# Patient Record
Sex: MALE | Race: WHITE | NOT HISPANIC OR LATINO | Employment: FULL TIME | URBAN - METROPOLITAN AREA
[De-identification: names, ages, dates, MRNs, and addresses within clinical notes are randomized per-mention and may not be internally consistent; named-entity substitution may affect disease eponyms.]

---

## 2019-08-24 ENCOUNTER — OFFICE VISIT (OUTPATIENT)
Dept: URGENT CARE | Facility: CLINIC | Age: 34
End: 2019-08-24
Payer: COMMERCIAL

## 2019-08-24 VITALS
RESPIRATION RATE: 18 BRPM | TEMPERATURE: 97 F | WEIGHT: 170 LBS | BODY MASS INDEX: 23.03 KG/M2 | HEIGHT: 72 IN | SYSTOLIC BLOOD PRESSURE: 112 MMHG | OXYGEN SATURATION: 99 % | DIASTOLIC BLOOD PRESSURE: 75 MMHG | HEART RATE: 70 BPM

## 2019-08-24 DIAGNOSIS — J06.9 VIRAL URI WITH COUGH: Primary | ICD-10-CM

## 2019-08-24 PROCEDURE — 99203 OFFICE O/P NEW LOW 30 MIN: CPT | Mod: 25,S$GLB,, | Performed by: NURSE PRACTITIONER

## 2019-08-24 PROCEDURE — 3008F PR BODY MASS INDEX (BMI) DOCUMENTED: ICD-10-PCS | Mod: CPTII,S$GLB,, | Performed by: NURSE PRACTITIONER

## 2019-08-24 PROCEDURE — 99203 PR OFFICE/OUTPT VISIT, NEW, LEVL III, 30-44 MIN: ICD-10-PCS | Mod: 25,S$GLB,, | Performed by: NURSE PRACTITIONER

## 2019-08-24 PROCEDURE — 96372 THER/PROPH/DIAG INJ SC/IM: CPT | Mod: S$GLB,,, | Performed by: NURSE PRACTITIONER

## 2019-08-24 PROCEDURE — 96372 PR INJECTION,THERAP/PROPH/DIAG2ST, IM OR SUBCUT: ICD-10-PCS | Mod: S$GLB,,, | Performed by: NURSE PRACTITIONER

## 2019-08-24 PROCEDURE — 3008F BODY MASS INDEX DOCD: CPT | Mod: CPTII,S$GLB,, | Performed by: NURSE PRACTITIONER

## 2019-08-24 RX ORDER — BETAMETHASONE SODIUM PHOSPHATE AND BETAMETHASONE ACETATE 3; 3 MG/ML; MG/ML
9 INJECTION, SUSPENSION INTRA-ARTICULAR; INTRALESIONAL; INTRAMUSCULAR; SOFT TISSUE
Status: COMPLETED | OUTPATIENT
Start: 2019-08-24 | End: 2019-08-24

## 2019-08-24 RX ORDER — CETIRIZINE HYDROCHLORIDE 10 MG/1
10 TABLET ORAL DAILY
COMMUNITY
End: 2022-01-25

## 2019-08-24 RX ADMIN — BETAMETHASONE SODIUM PHOSPHATE AND BETAMETHASONE ACETATE 9 MG: 3; 3 INJECTION, SUSPENSION INTRA-ARTICULAR; INTRALESIONAL; INTRAMUSCULAR; SOFT TISSUE at 02:08

## 2019-08-24 NOTE — PATIENT INSTRUCTIONS
CONTINUE WITH DECONGESTANT AND START USING FLONASE  DELSYM FOR COUGH    You must understand that you've received an Urgent Care treatment only and that you may be released before all your medical problems are known or treated. You, the patient, will arrange for follow up care as instructed.  If your condition worsens we recommend that you receive another evaluation at the emergency room immediately or contact your primary medical clinics after hours call service to discuss your concerns.  Please return here or go to the Emergency Department for any concerns or worsening of condition.      Viral Upper Respiratory Illness (Adult)  You have a viral upper respiratory illness (URI), which is another term for the common cold. This illness is contagious during the first few days. It is spread through the air by coughing and sneezing. It may also be spread by direct contact (touching the sick person and then touching your own eyes, nose, or mouth). Frequent handwashing will decrease risk of spread. Most viral illnesses go away within 7 to 10 days with rest and simple home remedies. Sometimes the illness may last for several weeks. Antibiotics will not kill a virus, and they are generally not prescribed for this condition.    Home care  · If symptoms are severe, rest at home for the first 2 to 3 days. When you resume activity, don't let yourself get too tired.  · Avoid being exposed to cigarette smoke (yours or others).  · You may use acetaminophen or ibuprofen to control pain and fever, unless another medicine was prescribed. (Note: If you have chronic liver or kidney disease, have ever had a stomach ulcer or gastrointestinal bleeding, or are taking blood-thinning medicines, talk with your healthcare provider before using these medicines.) Aspirin should never be given to anyone under 18 years of age who is ill with a viral infection or fever. It may cause severe liver or brain damage.  · Your appetite may be poor, so a  light diet is fine. Avoid dehydration by drinking 6 to 8 glasses of fluids per day (water, soft drinks, juices, tea, or soup). Extra fluids will help loosen secretions in the nose and lungs.  · Over-the-counter cold medicines will not shorten the length of time youre sick, but they may be helpful for the following symptoms: cough, sore throat, and nasal and sinus congestion. (Note: Do not use decongestants if you have high blood pressure.)  Follow-up care  Follow up with your healthcare provider, or as advised.  When to seek medical advice  Call your healthcare provider right away if any of these occur:  · Cough with lots of colored sputum (mucus)  · Severe headache; face, neck, or ear pain  · Difficulty swallowing due to throat pain  · Fever of 100.4°F (38°C)  Call 911, or get immediate medical care  Call emergency services right away if any of these occur:  · Chest pain, shortness of breath, wheezing, or difficulty breathing  · Coughing up blood  · Inability to swallow due to throat pain  Date Last Reviewed: 9/13/2015  © 1840-1218 [employer]. [address] [place] [id number]. All rights reserved. This information is not intended as a substitute for professional medical care. Always follow your healthcare professional's instructions.

## 2019-08-24 NOTE — PROGRESS NOTES
Subjective:       Patient ID: Jarret Grant is a 34 y.o. male.    Vitals:  height is 6' (1.829 m) and weight is 77.1 kg (170 lb). His oral temperature is 97 °F (36.1 °C). His blood pressure is 112/75 and his pulse is 70. His respiration is 18 and oxygen saturation is 99%.     Chief Complaint: URI    Pt has been taking mucinex-D and has been symptomatic primarily with nasal congestion/post nasal drip since Thursday.    URI    This is a new problem. The current episode started in the past 7 days. The problem has been gradually worsening. There has been no fever. Associated symptoms include congestion, coughing, headaches, sinus pain and a sore throat. Pertinent negatives include no abdominal pain, chest pain, diarrhea, dysuria, ear pain, joint pain, joint swelling, nausea, neck pain, plugged ear sensation, rash, rhinorrhea, sneezing, swollen glands, vomiting or wheezing. He has tried decongestant for the symptoms. The treatment provided mild relief.       Constitution: Negative for chills, sweating, fatigue and fever.   HENT: Positive for congestion, postnasal drip, sinus pain, sinus pressure and sore throat. Negative for ear pain and voice change.    Neck: Negative for neck pain and painful lymph nodes.   Cardiovascular: Negative for chest pain.   Eyes: Negative for eye redness.   Respiratory: Positive for cough and sputum production. Negative for chest tightness, bloody sputum, COPD, shortness of breath, stridor, wheezing and asthma.    Gastrointestinal: Negative for abdominal pain, nausea, vomiting and diarrhea.   Genitourinary: Negative for dysuria.   Musculoskeletal: Negative for muscle ache.   Skin: Negative for rash.   Allergic/Immunologic: Positive for seasonal allergies. Negative for asthma and sneezing.   Neurological: Positive for headaches.   Hematologic/Lymphatic: Negative for swollen lymph nodes.       Objective:      Physical Exam   Constitutional: He is oriented to person, place, and time. He appears  well-developed and well-nourished. He is cooperative.  Non-toxic appearance. He does not appear ill. No distress.   HENT:   Head: Normocephalic and atraumatic.   Right Ear: Hearing, tympanic membrane, external ear and ear canal normal.   Left Ear: Hearing, tympanic membrane, external ear and ear canal normal.   Nose: Nose normal. No mucosal edema, rhinorrhea or nasal deformity. No epistaxis. Right sinus exhibits no maxillary sinus tenderness and no frontal sinus tenderness. Left sinus exhibits no maxillary sinus tenderness and no frontal sinus tenderness.   Mouth/Throat: Uvula is midline, oropharynx is clear and moist and mucous membranes are normal. No trismus in the jaw. Normal dentition. No uvula swelling. No posterior oropharyngeal erythema.   Eyes: Conjunctivae and lids are normal. No scleral icterus.   Sclera clear bilat   Neck: Trachea normal, full passive range of motion without pain and phonation normal. Neck supple.   Cardiovascular: Normal rate, regular rhythm, normal heart sounds, intact distal pulses and normal pulses.   Pulmonary/Chest: Effort normal and breath sounds normal. No respiratory distress.   NO COUGHING   Abdominal: Soft. Normal appearance and bowel sounds are normal. He exhibits no distension. There is no tenderness.   Musculoskeletal: Normal range of motion. He exhibits no edema or deformity.   Neurological: He is alert and oriented to person, place, and time. He exhibits normal muscle tone. Coordination normal.   Skin: Skin is warm, dry and intact. He is not diaphoretic. No pallor.   Psychiatric: He has a normal mood and affect. His speech is normal and behavior is normal. Judgment and thought content normal. Cognition and memory are normal.   Nursing note and vitals reviewed.      Assessment:       1. Viral URI with cough        Plan:         Viral URI with cough  -     betamethasone acetate-betamethasone sodium phosphate injection 9 mg            Patient Instructions   CONTINUE WITH  DECONGESTANT AND START USING FLONASE  DELSYM FOR COUGH    You must understand that you've received an Urgent Care treatment only and that you may be released before all your medical problems are known or treated. You, the patient, will arrange for follow up care as instructed.  If your condition worsens we recommend that you receive another evaluation at the emergency room immediately or contact your primary medical clinics after hours call service to discuss your concerns.  Please return here or go to the Emergency Department for any concerns or worsening of condition.      Viral Upper Respiratory Illness (Adult)  You have a viral upper respiratory illness (URI), which is another term for the common cold. This illness is contagious during the first few days. It is spread through the air by coughing and sneezing. It may also be spread by direct contact (touching the sick person and then touching your own eyes, nose, or mouth). Frequent handwashing will decrease risk of spread. Most viral illnesses go away within 7 to 10 days with rest and simple home remedies. Sometimes the illness may last for several weeks. Antibiotics will not kill a virus, and they are generally not prescribed for this condition.    Home care  · If symptoms are severe, rest at home for the first 2 to 3 days. When you resume activity, don't let yourself get too tired.  · Avoid being exposed to cigarette smoke (yours or others).  · You may use acetaminophen or ibuprofen to control pain and fever, unless another medicine was prescribed. (Note: If you have chronic liver or kidney disease, have ever had a stomach ulcer or gastrointestinal bleeding, or are taking blood-thinning medicines, talk with your healthcare provider before using these medicines.) Aspirin should never be given to anyone under 18 years of age who is ill with a viral infection or fever. It may cause severe liver or brain damage.  · Your appetite may be poor, so a light diet is  fine. Avoid dehydration by drinking 6 to 8 glasses of fluids per day (water, soft drinks, juices, tea, or soup). Extra fluids will help loosen secretions in the nose and lungs.  · Over-the-counter cold medicines will not shorten the length of time youre sick, but they may be helpful for the following symptoms: cough, sore throat, and nasal and sinus congestion. (Note: Do not use decongestants if you have high blood pressure.)  Follow-up care  Follow up with your healthcare provider, or as advised.  When to seek medical advice  Call your healthcare provider right away if any of these occur:  · Cough with lots of colored sputum (mucus)  · Severe headache; face, neck, or ear pain  · Difficulty swallowing due to throat pain  · Fever of 100.4°F (38°C)  Call 911, or get immediate medical care  Call emergency services right away if any of these occur:  · Chest pain, shortness of breath, wheezing, or difficulty breathing  · Coughing up blood  · Inability to swallow due to throat pain  Date Last Reviewed: 9/13/2015 © 2000-2017 [employer]. [address] [place] [id number]. All rights reserved. This information is not intended as a substitute for professional medical care. Always follow your healthcare professional's instructions.

## 2019-12-16 ENCOUNTER — OFFICE VISIT (OUTPATIENT)
Dept: OTOLARYNGOLOGY | Facility: CLINIC | Age: 34
End: 2019-12-16
Payer: COMMERCIAL

## 2019-12-16 VITALS
HEART RATE: 62 BPM | DIASTOLIC BLOOD PRESSURE: 70 MMHG | BODY MASS INDEX: 23.05 KG/M2 | WEIGHT: 170 LBS | SYSTOLIC BLOOD PRESSURE: 119 MMHG

## 2019-12-16 DIAGNOSIS — J34.89 NASAL OBSTRUCTION: ICD-10-CM

## 2019-12-16 DIAGNOSIS — J34.2 NASAL SEPTAL DEVIATION: ICD-10-CM

## 2019-12-16 DIAGNOSIS — J30.9 ALLERGIC RHINITIS, UNSPECIFIED SEASONALITY, UNSPECIFIED TRIGGER: Primary | ICD-10-CM

## 2019-12-16 DIAGNOSIS — J34.3 NASAL TURBINATE HYPERTROPHY: ICD-10-CM

## 2019-12-16 DIAGNOSIS — J32.9 CHRONIC RECURRENT SINUSITIS: ICD-10-CM

## 2019-12-16 PROCEDURE — 99204 OFFICE O/P NEW MOD 45 MIN: CPT | Mod: S$GLB,,, | Performed by: OTOLARYNGOLOGY

## 2019-12-16 PROCEDURE — 99999 PR PBB SHADOW E&M-EST. PATIENT-LVL III: ICD-10-PCS | Mod: PBBFAC,,, | Performed by: OTOLARYNGOLOGY

## 2019-12-16 PROCEDURE — 3008F BODY MASS INDEX DOCD: CPT | Mod: CPTII,S$GLB,, | Performed by: OTOLARYNGOLOGY

## 2019-12-16 PROCEDURE — 99204 PR OFFICE/OUTPT VISIT, NEW, LEVL IV, 45-59 MIN: ICD-10-PCS | Mod: S$GLB,,, | Performed by: OTOLARYNGOLOGY

## 2019-12-16 PROCEDURE — 99999 PR PBB SHADOW E&M-EST. PATIENT-LVL III: CPT | Mod: PBBFAC,,, | Performed by: OTOLARYNGOLOGY

## 2019-12-16 PROCEDURE — 3008F PR BODY MASS INDEX (BMI) DOCUMENTED: ICD-10-PCS | Mod: CPTII,S$GLB,, | Performed by: OTOLARYNGOLOGY

## 2019-12-16 NOTE — PROGRESS NOTES
Mr. Grant     Vitals:    19 1553   BP: 119/70   Pulse: 62       Chief Complaint:  Sinus Problem (recurrent infections)       HPI:   is a 34-year-old white male who presents complaining of sinus issues including sinus infections.  He states that he gets these proximally 2-3 times per year which include nasal discharge as well as sinus pressure pains.  He has used sinus rinses and he feels this is coming on he also has tried Flonase in the past with questionable affect.    Review of Systems:  Constitutional:   weight loss or weight gain: Negative  Allergy/Immunologic:   Negative  Nasal Congestion/Obstruction:   Negative  Nosebleeds:   Negative  Sinus infections:   Negative  Headache/Facial Pain:   Negative  Snoring/ANGEL:   Negative  Throat: Infections/Pain:   Negative  Hoarseness/Speech Disturbance:   Negative  Trauma Hx:  Negative    Cardiovascular:  M/I Angina: Negative  Hypertension: Negative  Endocrine:    DM/Steroids: Negative  GI:   Dysphagia/Reflux: Negative  :   GYN Pregnancy: Negative  Renal:   Dialysis: Negative  Lymphatic:   Neck Mass/Lymphadenopathy: Negative  Muscoloskeletal:   Negative  Hematologic:   Bleeding Disorders/Anemia: Negative  Neurologic:    Cranial/Neuralgia: Negative  Pulmonary:   Asthma/SOB/Cough: Negative  Skin Disorders: Negative    Past Medical/Surgical/Family/Social History:    ENT Surgery: Negative  Occupational Exposure: Negative   Problems: Negative  Cancer: Negative    Past Family History:   Family history of Cancer: Negative    Past Social History:   Tobacco: Nonsmoker   Alcohol: Social Drinker      Allergies and medications: Reviewed per med card.    Physical Examination:  Ears:   External auditory canals:  Left canal shows a dry cerumen impaction, right is clear.   Hearing: Grossly intact   Tympanic Membranes: Clear  Nose:   External: Normal   Intranasal:  Septal deviation and spur to the right with 2+ turbinates creating 80%  obstruction.  Mouth:   Intraorally: Lips, teeth, and gums: Normal   Oropharynx: Normal   Mucosa: Normal   Tongue: Normal  Throat:      Palate: Normal palate with elevation   Tonsils:  1 to 2+   Posterior Pharynx: Normal  Fiberoptic exam: Not performed  Head/Face:     Inspection: Normal and atraumatic   Palpation/Percussion: Non tender   Facial strength: Normal and symmetric   Salivary glands: Normal  Neck: Supple  Thyroid: No masses  Lymphatics: No nodes  Respiratory:   Effort: Normal  Eyes:   Ocular Mobility: Normal   Vision: Grossly intact  Neuro/Psych:   Cranial Nerves: Grossly Intact   Orientation: Normal   Mood/Affect: Normal      Assessment/Plan:  I have discussed my findings with him in detail as well as my recommendations for treatment.  We have discussed the use of saline sinus rinses utilizing distilled water on a regular basis.  I have also asked him to try Flonase daily for 3-4 weeks.  We will obtain a [employer] CT scan of his sinuses and he will contact us after this is completed to arrange for follow-up.    Answers for HPI/ROS submitted by the patient on 12/16/2019   tinnitus: Yes  sinus pressure : Yes  Sinus infection(s)?: Yes  congestion: Yes  rhinorrhea: Yes  postnasal drip: Yes  sore throat: Yes  Snoring?: Yes  Sleep disturbances due to breathing?: Yes  back pain: Yes

## 2019-12-20 ENCOUNTER — HOSPITAL ENCOUNTER (OUTPATIENT)
Dept: RADIOLOGY | Facility: HOSPITAL | Age: 34
Discharge: HOME | End: 2019-12-20
Attending: OTOLARYNGOLOGY
Payer: COMMERCIAL

## 2019-12-20 DIAGNOSIS — J32.9 CHRONIC RECURRENT SINUSITIS: ICD-10-CM

## 2019-12-20 PROCEDURE — 70486 CT MEDTRONIC SINUSES WITHOUT: ICD-10-PCS | Mod: 26,,, | Performed by: RADIOLOGY

## 2019-12-20 PROCEDURE — 70486 CT MAXILLOFACIAL W/O DYE: CPT | Mod: TC

## 2019-12-20 PROCEDURE — 70486 CT MAXILLOFACIAL W/O DYE: CPT | Mod: 26,,, | Performed by: RADIOLOGY

## 2019-12-24 ENCOUNTER — OFFICE VISIT (OUTPATIENT)
Dept: OTOLARYNGOLOGY | Facility: CLINIC | Age: 34
End: 2019-12-24
Payer: COMMERCIAL

## 2019-12-24 ENCOUNTER — CLINICAL SUPPORT (OUTPATIENT)
Dept: AUDIOLOGY | Facility: CLINIC | Age: 34
End: 2019-12-24
Payer: COMMERCIAL

## 2019-12-24 VITALS
WEIGHT: 169 LBS | SYSTOLIC BLOOD PRESSURE: 112 MMHG | DIASTOLIC BLOOD PRESSURE: 74 MMHG | HEART RATE: 74 BPM | BODY MASS INDEX: 22.92 KG/M2

## 2019-12-24 DIAGNOSIS — H93.13 TINNITUS OF BOTH EARS: ICD-10-CM

## 2019-12-24 DIAGNOSIS — H61.22 IMPACTED CERUMEN OF LEFT EAR: ICD-10-CM

## 2019-12-24 DIAGNOSIS — R09.82 POST-NASAL DRIP: ICD-10-CM

## 2019-12-24 DIAGNOSIS — H90.5 SENSORINEURAL HEARING LOSS OF HIGH FREQUENCY: ICD-10-CM

## 2019-12-24 DIAGNOSIS — H90.42 SENSORINEURAL HEARING LOSS (SNHL) OF LEFT EAR WITH UNRESTRICTED HEARING OF RIGHT EAR: Primary | ICD-10-CM

## 2019-12-24 DIAGNOSIS — J34.2 NASAL SEPTAL DEVIATION: ICD-10-CM

## 2019-12-24 DIAGNOSIS — J30.1 SEASONAL ALLERGIC RHINITIS DUE TO POLLEN: Primary | ICD-10-CM

## 2019-12-24 PROCEDURE — 92550 PR TYMPANOMETRY AND REFLEX THRESHOLD MEASUREMENTS: ICD-10-PCS | Mod: S$GLB,,, | Performed by: AUDIOLOGIST-HEARING AID FITTER

## 2019-12-24 PROCEDURE — 99213 PR OFFICE/OUTPT VISIT, EST, LEVL III, 20-29 MIN: ICD-10-PCS | Mod: 25,S$GLB,, | Performed by: OTOLARYNGOLOGY

## 2019-12-24 PROCEDURE — 92550 TYMPANOMETRY & REFLEX THRESH: CPT | Mod: S$GLB,,, | Performed by: AUDIOLOGIST-HEARING AID FITTER

## 2019-12-24 PROCEDURE — 3008F PR BODY MASS INDEX (BMI) DOCUMENTED: ICD-10-PCS | Mod: CPTII,S$GLB,, | Performed by: OTOLARYNGOLOGY

## 2019-12-24 PROCEDURE — 92557 COMPREHENSIVE HEARING TEST: CPT | Mod: S$GLB,,, | Performed by: AUDIOLOGIST-HEARING AID FITTER

## 2019-12-24 PROCEDURE — 99213 OFFICE O/P EST LOW 20 MIN: CPT | Mod: 25,S$GLB,, | Performed by: OTOLARYNGOLOGY

## 2019-12-24 PROCEDURE — 99999 PR PBB SHADOW E&M-EST. PATIENT-LVL III: CPT | Mod: PBBFAC,,, | Performed by: OTOLARYNGOLOGY

## 2019-12-24 PROCEDURE — 69210 PR REMOVAL IMPACTED CERUMEN REQUIRING INSTRUMENTATION, UNILATERAL: ICD-10-PCS | Mod: S$GLB,,, | Performed by: OTOLARYNGOLOGY

## 2019-12-24 PROCEDURE — 69210 REMOVE IMPACTED EAR WAX UNI: CPT | Mod: S$GLB,,, | Performed by: OTOLARYNGOLOGY

## 2019-12-24 PROCEDURE — 99999 PR PBB SHADOW E&M-EST. PATIENT-LVL III: ICD-10-PCS | Mod: PBBFAC,,, | Performed by: OTOLARYNGOLOGY

## 2019-12-24 PROCEDURE — 92557 PR COMPREHENSIVE HEARING TEST: ICD-10-PCS | Mod: S$GLB,,, | Performed by: AUDIOLOGIST-HEARING AID FITTER

## 2019-12-24 PROCEDURE — 3008F BODY MASS INDEX DOCD: CPT | Mod: CPTII,S$GLB,, | Performed by: OTOLARYNGOLOGY

## 2019-12-24 NOTE — PATIENT INSTRUCTIONS
Continue using nasal saline rinses with distilled water as directed by Dr. Ross.  Also continue using Flonase.  Patient was redirected on proper application of the spray.    Discussed ear care and hearing preservation.  Audiologic test was reviewed.  The patient should follow up in 1 year for another audiogram.    CT sinus images were reviewed with the patient.  No sign of any chronic sinusitis.  Patient does have right nasal septal deviation and can follow up with Dr. Ross for consideration treatment.

## 2019-12-24 NOTE — PROGRESS NOTES
34-year-old male who presents for evaluation of ringing in the ear. Feels like it happens in both ears but the left is worse than the right. It occurs couple of times a week for months.  It only lasts for a few minutes.  He does not associated with any specific event.  He did have significant noise exposure around machinery when he was younger.  Otherwise he has not had any head trauma.  No other toxic medications no ear surgeries.    He recently saw Dr. Ross for evaluation his sinuses.  He underwent a CT scan of the sinuses.  He is wondering whether results are back.  He has had some postnasal drip in the past couple of days; no discolored mucus.  He took Mucinex D.  He has been using the Flonase but is concerned about nosebleeds.  He has been using the saline sinus rinse as directed by Dr. Ross    PMHx, PSHx, Meds, Allergies, SocHx, FamHx reviewed in EPIC    Review of Systems   Constitutional: Negative for chills, fever and weight loss.   HENT: Positive for congestion and tinnitus. Negative for ear discharge, ear pain, hearing loss, nosebleeds, sinus pain and sore throat.         Post nasal drip   Eyes: Negative for blurred vision, double vision and pain.   Respiratory: Negative for cough, shortness of breath and wheezing.    Cardiovascular: Negative for chest pain and palpitations.   Gastrointestinal: Negative for abdominal pain, heartburn and melena.   Genitourinary: Negative for dysuria and hematuria.   Musculoskeletal: Negative for joint pain, myalgias and neck pain.   Skin: Negative for itching.   Neurological: Negative for dizziness, tingling, speech change, weakness and headaches.   Psychiatric/Behavioral: Negative for depression. The patient is not nervous/anxious.        PE: /74   Pulse 74   Wt 76.7 kg (169 lb)   BMI 22.92 kg/m²   Pleasant Male, WN, WD, NAD, cooperative and alert  Ears: some cerumen left 50% obstructive with translucent TMs bilaterally  Nose: right mod NSD spur, boggy  turbinates, causing 75-80% obstruction on right, clear drainage, no visible polyps  OC/OP: midline tongue, teeth in good repair, MMM, tonsils 1-2+, some clear PND  Neck : no LAD or masses  Face: no TTP over sinuses  Chest: good chest excursion, no retractions  Skin: no visible concerning lesions of face, dry and intact  Lymph: no neck LAD  Neuro: Facial nerve intact, tongue protrudes in midline, palate raises symmetrically     Procedure: Removal of cerumen impaction using microsurgical instrumentation.  After explaining the procedure and obtaining verbal assent, the patient was positioned in chair and each external auditory canal visualized with magnification. The obstructing cerumen was removed with microsurgical instrumentation to reveal normal and healthy external auditory canals.  Left ear cleared. The patient tolerated this procedure well without complication.          I independently reviewed the tracings of the complete audiometric evaluation performed today.  I reviewed the audiogram with the patient as well.  Pertinent findings include high frequency bilateral sensorineural hearing loss.      Impression:  1. Seasonal allergic rhinitis due to pollen     2. Nasal septal deviation     3. Tinnitus of both ears     4. Sensorineural hearing loss of high frequency     5. Post-nasal drip       Discussion and Plan:  Continue using nasal saline rinses with distilled water as directed by Dr. Ross.  Also continue using Flonase.  Patient was redirected on proper application of the spray.    Discussed ear care and hearing preservation.  Audiologic test was reviewed.  The patient should follow up in 1 year for another audiogram.    CT sinus images were reviewed with the patient.  No sign of any chronic sinusitis.  Patient does have right nasal septal deviation and can follow up with Dr. Ross for consideration treatment.

## 2019-12-24 NOTE — PROGRESS NOTES
Jarret Grant was seen in the clinic today for a hearing evaluation. Mr. Grant reported tinnitus worse in his left ear.      Otoscopy was unremarkable. Audiological testing revealed normal to borderline normal hearing in the right ear and a mild high frequency sensorineural hearing loss in the left ear. A speech reception threshold was obtained at 5 dBHL, bilaterally. Speech recognition was 100% in both ears.    Tympanometry revealed hyper-compliant Type A tympanograms, bilaterally.  Ipsilateral acoustic reflex thresholds were absent in both ears.    Recommendations:  1. Otologic evaluation  2. Annual hearing evaluation

## 2019-12-26 ENCOUNTER — OFFICE VISIT (OUTPATIENT)
Dept: URGENT CARE | Facility: CLINIC | Age: 34
End: 2019-12-26
Payer: COMMERCIAL

## 2019-12-26 VITALS
BODY MASS INDEX: 22.89 KG/M2 | HEIGHT: 72 IN | WEIGHT: 169 LBS | RESPIRATION RATE: 14 BRPM | SYSTOLIC BLOOD PRESSURE: 114 MMHG | HEART RATE: 64 BPM | DIASTOLIC BLOOD PRESSURE: 71 MMHG | TEMPERATURE: 98 F | OXYGEN SATURATION: 98 %

## 2019-12-26 DIAGNOSIS — R05.9 COUGH: ICD-10-CM

## 2019-12-26 DIAGNOSIS — R09.81 SINUS CONGESTION: ICD-10-CM

## 2019-12-26 DIAGNOSIS — J06.9 UPPER RESPIRATORY TRACT INFECTION, UNSPECIFIED TYPE: Primary | ICD-10-CM

## 2019-12-26 LAB
CTP QC/QA: YES
FLUAV AG NPH QL: NEGATIVE
FLUBV AG NPH QL: NEGATIVE

## 2019-12-26 PROCEDURE — 96372 PR INJECTION,THERAP/PROPH/DIAG2ST, IM OR SUBCUT: ICD-10-PCS | Mod: S$GLB,,, | Performed by: FAMILY MEDICINE

## 2019-12-26 PROCEDURE — 99214 PR OFFICE/OUTPT VISIT, EST, LEVL IV, 30-39 MIN: ICD-10-PCS | Mod: 25,S$GLB,, | Performed by: FAMILY MEDICINE

## 2019-12-26 PROCEDURE — 96372 THER/PROPH/DIAG INJ SC/IM: CPT | Mod: S$GLB,,, | Performed by: FAMILY MEDICINE

## 2019-12-26 PROCEDURE — 99214 OFFICE O/P EST MOD 30 MIN: CPT | Mod: 25,S$GLB,, | Performed by: FAMILY MEDICINE

## 2019-12-26 PROCEDURE — 87804 POCT INFLUENZA A/B: ICD-10-PCS | Mod: QW,S$GLB,, | Performed by: FAMILY MEDICINE

## 2019-12-26 PROCEDURE — 87804 INFLUENZA ASSAY W/OPTIC: CPT | Mod: QW,S$GLB,, | Performed by: FAMILY MEDICINE

## 2019-12-26 RX ORDER — DESONIDE 0.5 MG/G
CREAM TOPICAL
COMMUNITY
Start: 2019-10-20 | End: 2022-01-25

## 2019-12-26 RX ORDER — BETAMETHASONE SODIUM PHOSPHATE AND BETAMETHASONE ACETATE 3; 3 MG/ML; MG/ML
9 INJECTION, SUSPENSION INTRA-ARTICULAR; INTRALESIONAL; INTRAMUSCULAR; SOFT TISSUE
Status: COMPLETED | OUTPATIENT
Start: 2019-12-26 | End: 2019-12-26

## 2019-12-26 RX ORDER — KETOCONAZOLE 20 MG/ML
SHAMPOO, SUSPENSION TOPICAL
COMMUNITY
Start: 2019-10-20

## 2019-12-26 RX ORDER — AZELASTINE 1 MG/ML
1 SPRAY, METERED NASAL 2 TIMES DAILY
Qty: 30 ML | Refills: 0 | Status: SHIPPED | OUTPATIENT
Start: 2019-12-26 | End: 2020-01-25

## 2019-12-26 RX ADMIN — BETAMETHASONE SODIUM PHOSPHATE AND BETAMETHASONE ACETATE 9 MG: 3; 3 INJECTION, SUSPENSION INTRA-ARTICULAR; INTRALESIONAL; INTRAMUSCULAR; SOFT TISSUE at 12:12

## 2019-12-26 NOTE — PATIENT INSTRUCTIONS
Viral Upper Respiratory Illness (Adult)  You have a viral upper respiratory illness (URI), which is another term for the common cold. This illness is contagious during the first few days. It is spread through the air by coughing and sneezing. It may also be spread by direct contact (touching the sick person and then touching your own eyes, nose, or mouth). Frequent handwashing will decrease risk of spread. Most viral illnesses go away within 7 to 10 days with rest and simple home remedies. Sometimes the illness may last for several weeks. Antibiotics will not kill a virus, and they are generally not prescribed for this condition.    Home care  · If symptoms are severe, rest at home for the first 2 to 3 days. When you resume activity, don't let yourself get too tired.  · Avoid being exposed to cigarette smoke (yours or others).  · You may use acetaminophen or ibuprofen to control pain and fever, unless another medicine was prescribed. (Note: If you have chronic liver or kidney disease, have ever had a stomach ulcer or gastrointestinal bleeding, or are taking blood-thinning medicines, talk with your healthcare provider before using these medicines.) Aspirin should never be given to anyone under 18 years of age who is ill with a viral infection or fever. It may cause severe liver or brain damage.  · Your appetite may be poor, so a light diet is fine. Avoid dehydration by drinking 6 to 8 glasses of fluids per day (water, soft drinks, juices, tea, or soup). Extra fluids will help loosen secretions in the nose and lungs.  · Over-the-counter cold medicines will not shorten the length of time youre sick, but they may be helpful for the following symptoms: cough, sore throat, and nasal and sinus congestion. (Note: Do not use decongestants if you have high blood pressure.)  Follow-up care  Follow up with your healthcare provider, or as advised.  When to seek medical advice  Call your healthcare provider right away if any  of these occur:  · Cough with lots of colored sputum (mucus)  · Severe headache; face, neck, or ear pain  · Difficulty swallowing due to throat pain  · Fever of 100.4°F (38°C)  Call 911, or get immediate medical care  Call emergency services right away if any of these occur:  · Chest pain, shortness of breath, wheezing, or difficulty breathing  · Coughing up blood  · Inability to swallow due to throat pain  Date Last Reviewed: 9/13/2015 © 2000-2017 [employer]. [address] [place] [address]. All rights reserved. This information is not intended as a substitute for professional medical care. Always follow your healthcare professional's instructions.      Follow up with your doctor in a few days as needed.  Return to the urgent care or go to the ER if symptoms get worse.    Obie Clark MD

## 2019-12-26 NOTE — PROGRESS NOTES
Subjective:       Patient ID: Jarret Grant is a 34 y.o. male.    Chief Complaint: Sinus Problem    Pt c/o cough, sinus pressure, scratchy throat,  post nasal drip, and head congestion    pt requesting abx     Sinus Problem   This is a new problem. The current episode started in the past 7 days. The problem has been gradually worsening since onset. There has been no fever. Associated symptoms include congestion (head congestion ), coughing and sinus pressure. Pertinent negatives include no chills, diaphoresis, ear pain, shortness of breath or sore throat. Past treatments include saline sprays (mucinex D; flonase ).       Constitution: Negative for chills, sweating, fatigue and fever.   HENT: Positive for congestion (head congestion ), postnasal drip and sinus pressure. Negative for ear pain, sinus pain, sore throat and voice change.    Neck: Negative for painful lymph nodes.   Eyes: Negative for eye redness.   Respiratory: Positive for cough and sputum production. Negative for chest tightness, bloody sputum, COPD, shortness of breath, stridor, wheezing and asthma.    Gastrointestinal: Negative for nausea and vomiting.   Musculoskeletal: Negative for muscle ache.   Skin: Negative for rash.   Allergic/Immunologic: Negative for seasonal allergies and asthma.   Hematologic/Lymphatic: Negative for swollen lymph nodes.        Objective:      Physical Exam   Constitutional: He is oriented to person, place, and time. He appears well-developed and well-nourished. He is cooperative.  Non-toxic appearance. He does not appear ill. No distress.   HENT:   Head: Normocephalic and atraumatic.   Right Ear: Hearing, tympanic membrane, external ear and ear canal normal.   Left Ear: Hearing, tympanic membrane, external ear and ear canal normal.   Nose: Nose normal. No mucosal edema, rhinorrhea or nasal deformity. No epistaxis. Right sinus exhibits no maxillary sinus tenderness and no frontal sinus tenderness. Left sinus exhibits no  maxillary sinus tenderness and no frontal sinus tenderness.   Mouth/Throat: Uvula is midline, oropharynx is clear and moist and mucous membranes are normal. No trismus in the jaw. Normal dentition. No uvula swelling. No posterior oropharyngeal erythema.   Eyes: Conjunctivae and lids are normal. Right eye exhibits no discharge. Left eye exhibits no discharge. No scleral icterus.   Sclera clear bilat   Neck: Trachea normal, normal range of motion, full passive range of motion without pain and phonation normal. Neck supple.   Cardiovascular: Normal rate, regular rhythm, normal heart sounds, intact distal pulses and normal pulses.   Pulmonary/Chest: Effort normal and breath sounds normal. No respiratory distress. He has no wheezes. He has no rales.   Abdominal: Soft. Normal appearance and bowel sounds are normal. He exhibits no distension and no pulsatile midline mass. There is no tenderness.   Musculoskeletal: Normal range of motion. He exhibits no edema or deformity.   Lymphadenopathy:     He has no cervical adenopathy.   Neurological: He is alert and oriented to person, place, and time. He exhibits normal muscle tone. Coordination normal.   Skin: Skin is warm, dry and intact. He is not diaphoretic. No pallor.   Psychiatric: He has a normal mood and affect. His speech is normal and behavior is normal. Judgment and thought content normal. Cognition and memory are normal.   Nursing note and vitals reviewed.      Assessment:       1. Upper respiratory tract infection, unspecified type    2. Cough    3. Sinus congestion        Plan:         Medications Ordered This Encounter   Medications    azelastine (ASTELIN) 137 mcg (0.1 %) nasal spray     Si spray (137 mcg total) by Nasal route 2 (two) times daily.     Dispense:  30 mL     Refill:  0    betamethasone acetate-betamethasone sodium phosphate injection 9 mg            Follow up if symptoms worsen or fail to improve.      Patient Instructions     Viral Upper  Respiratory Illness (Adult)  You have a viral upper respiratory illness (URI), which is another term for the common cold. This illness is contagious during the first few days. It is spread through the air by coughing and sneezing. It may also be spread by direct contact (touching the sick person and then touching your own eyes, nose, or mouth). Frequent handwashing will decrease risk of spread. Most viral illnesses go away within 7 to 10 days with rest and simple home remedies. Sometimes the illness may last for several weeks. Antibiotics will not kill a virus, and they are generally not prescribed for this condition.    Home care  · If symptoms are severe, rest at home for the first 2 to 3 days. When you resume activity, don't let yourself get too tired.  · Avoid being exposed to cigarette smoke (yours or others).  · You may use acetaminophen or ibuprofen to control pain and fever, unless another medicine was prescribed. (Note: If you have chronic liver or kidney disease, have ever had a stomach ulcer or gastrointestinal bleeding, or are taking blood-thinning medicines, talk with your healthcare provider before using these medicines.) Aspirin should never be given to anyone under 18 years of age who is ill with a viral infection or fever. It may cause severe liver or brain damage.  · Your appetite may be poor, so a light diet is fine. Avoid dehydration by drinking 6 to 8 glasses of fluids per day (water, soft drinks, juices, tea, or soup). Extra fluids will help loosen secretions in the nose and lungs.  · Over-the-counter cold medicines will not shorten the length of time youre sick, but they may be helpful for the following symptoms: cough, sore throat, and nasal and sinus congestion. (Note: Do not use decongestants if you have high blood pressure.)  Follow-up care  Follow up with your healthcare provider, or as advised.  When to seek medical advice  Call your healthcare provider right away if any of these  occur:  · Cough with lots of colored sputum (mucus)  · Severe headache; face, neck, or ear pain  · Difficulty swallowing due to throat pain  · Fever of 100.4°F (38°C)  Call 911, or get immediate medical care  Call emergency services right away if any of these occur:  · Chest pain, shortness of breath, wheezing, or difficulty breathing  · Coughing up blood  · Inability to swallow due to throat pain  Date Last Reviewed: 9/13/2015 © 2000-2017 [employer]. [address] [place] [address]. All rights reserved. This information is not intended as a substitute for professional medical care. Always follow your healthcare professional's instructions.      Follow up with your doctor in a few days as needed.  Return to the urgent care or go to the ER if symptoms get worse.    Obie Clark MD

## 2020-01-02 ENCOUNTER — PATIENT MESSAGE (OUTPATIENT)
Dept: OTOLARYNGOLOGY | Facility: CLINIC | Age: 35
End: 2020-01-02

## 2020-01-03 ENCOUNTER — PATIENT MESSAGE (OUTPATIENT)
Dept: OTOLARYNGOLOGY | Facility: CLINIC | Age: 35
End: 2020-01-03

## 2020-01-06 ENCOUNTER — PATIENT MESSAGE (OUTPATIENT)
Dept: OTOLARYNGOLOGY | Facility: CLINIC | Age: 35
End: 2020-01-06

## 2020-01-07 ENCOUNTER — TELEPHONE (OUTPATIENT)
Dept: OTOLARYNGOLOGY | Facility: CLINIC | Age: 35
End: 2020-01-07

## 2020-01-07 NOTE — TELEPHONE ENCOUNTER
Attempted to call pt. Unavailable to contact pt.       ----- Message from Guillermina Dowling MA sent at 1/7/2020 12:19 PM CST -----  Did Caesar's test message get to you?    Guillermina    ----- Message -----  From: Flora Jose LPN  Sent: 1/7/2020  12:12 PM CST  To: Guillermina Dowling MA    I'm under [employer].   ----- Message -----  From: Guillermina Dowling MA  Sent: 1/7/2020  11:13 AM CST  To: Flora Jose LPN    Ok, are you under [facility] or [facility]? Did you see Caesar's test message? We are just trying to see where his messages are going.     Guillermina    ----- Message -----  From: Flora Jose LPN  Sent: 1/7/2020  10:53 AM CST  To: Guillermina Dowling MA    Yea. I added him to our pool this morning after Caesar's e-mail. So it was doubling :-(  ----- Message -----  From: Guillermina Dowling MA  Sent: 1/7/2020  10:40 AM CST  To: Flora Jose LPN    Oh good. I wasn't sure.     Thanks!!! Let me know if you need help with any of them.      Guillermina    ----- Message -----  From: Flora Jose LPN  Sent: 1/7/2020  10:35 AM CST  To: Guillermina Dowling MA    I have his messages. You no longer have to forward them.   Thanks!    ----- Message -----  From: Guillermina Dowling MA  Sent: 1/7/2020  10:31 AM CST  To: Flora Jose LPN    Can someone call this pt with the results of his scan per Dr. Ross. See his message below.       Guillermina    ----- Message -----  From: Matteo Ross III, MD  Sent: 1/7/2020  10:08 AM CST  To: Cody CAMPOS III Staff    Scans show no evidence of chronic recurrent sinus disease requiring surgical intervention.

## 2020-01-07 NOTE — TELEPHONE ENCOUNTER
----- Message from Guillermina Dowling MA sent at 1/7/2020 12:19 PM CST -----  Did Caesar's test message get to you?    Guillermina    ----- Message -----  From: Flora Jose LPN  Sent: 1/7/2020  12:12 PM CST  To: Guillermina Dowling MA    I'm under [employer].   ----- Message -----  From: Guillermina Dowling MA  Sent: 1/7/2020  11:13 AM CST  To: Flora Jose LPN    Ok, are you under [facility] or [facility]? Did you see Caesar's test message? We are just trying to see where his messages are going.     Guillermina    ----- Message -----  From: Flora Jose LPN  Sent: 1/7/2020  10:53 AM CST  To: Guillermina Dowling MA    Yejamaal. I added him to our pool this morning after Caesar's e-mail. So it was doubling :-(  ----- Message -----  From: Guillermina Dowling MA  Sent: 1/7/2020  10:40 AM CST  To: Flora Jose LPN    Oh good. I wasn't sure.     Thanks!!! Let me know if you need help with any of them.      Guillermina    ----- Message -----  From: Flora Jose LPN  Sent: 1/7/2020  10:35 AM CST  To: Guillermina Dowling MA    I have his messages. You no longer have to forward them.   Thanks!    ----- Message -----  From: Guillermina Dowling MA  Sent: 1/7/2020  10:31 AM CST  To: Flora Jose LPN    Can someone call this pt with the results of his scan per Dr. Ross. See his message below.       Guillermina    ----- Message -----  From: Matteo Ross III, MD  Sent: 1/7/2020  10:08 AM CST  To: Cody CAMPOS III Staff    Scans show no evidence of chronic recurrent sinus disease requiring surgical intervention.

## 2020-02-13 ENCOUNTER — OFFICE VISIT (OUTPATIENT)
Dept: URGENT CARE | Facility: CLINIC | Age: 35
End: 2020-02-13
Payer: COMMERCIAL

## 2020-02-13 VITALS
HEIGHT: 72 IN | OXYGEN SATURATION: 100 % | RESPIRATION RATE: 19 BRPM | HEART RATE: 57 BPM | BODY MASS INDEX: 23.03 KG/M2 | TEMPERATURE: 98 F | WEIGHT: 170 LBS | DIASTOLIC BLOOD PRESSURE: 76 MMHG | SYSTOLIC BLOOD PRESSURE: 119 MMHG

## 2020-02-13 DIAGNOSIS — J01.90 ACUTE BACTERIAL SINUSITIS: Primary | ICD-10-CM

## 2020-02-13 DIAGNOSIS — R51.9 SINUS HEADACHE: ICD-10-CM

## 2020-02-13 DIAGNOSIS — B96.89 ACUTE BACTERIAL SINUSITIS: Primary | ICD-10-CM

## 2020-02-13 PROCEDURE — 99214 PR OFFICE/OUTPT VISIT, EST, LEVL IV, 30-39 MIN: ICD-10-PCS | Mod: S$GLB,,, | Performed by: NURSE PRACTITIONER

## 2020-02-13 PROCEDURE — 99214 OFFICE O/P EST MOD 30 MIN: CPT | Mod: S$GLB,,, | Performed by: NURSE PRACTITIONER

## 2020-02-13 RX ORDER — AMOXICILLIN AND CLAVULANATE POTASSIUM 875; 125 MG/1; MG/1
1 TABLET, FILM COATED ORAL 2 TIMES DAILY
Qty: 14 TABLET | Refills: 0 | Status: SHIPPED | OUTPATIENT
Start: 2020-02-13 | End: 2020-02-20

## 2020-02-13 NOTE — PROGRESS NOTES
Subjective:       Patient ID: Jarret Grant is a 34 y.o. male.    Vitals:  height is 6' (1.829 m) and weight is 77.1 kg (170 lb). His oral temperature is 97.7 °F (36.5 °C). His blood pressure is 119/76 and his pulse is 57 (abnormal). His respiration is 19 and oxygen saturation is 100%.     Chief Complaint: Sinusitis    Pt c/o nasal congestion, productive cough, PND. Pt states that he was treated for a sinus infection with abx and oral steroids ~1 month ago. Pt reports no improvement of symptoms with Flonase, Muconex D, Allegra.    Sinusitis   This is a new problem. The current episode started 1 to 4 weeks ago (one month ago). The problem is unchanged. There has been no fever. He is experiencing no pain. Associated symptoms include congestion and coughing. Pertinent negatives include no chills, headaches, shortness of breath or sore throat. Treatments tried: see note.       Constitution: Negative for chills, fatigue and fever.   HENT: Positive for congestion and postnasal drip. Negative for sore throat.    Neck: Negative for painful lymph nodes.   Cardiovascular: Negative for chest pain and leg swelling.   Eyes: Negative for double vision and blurred vision.   Respiratory: Positive for cough and sputum production. Negative for shortness of breath.    Gastrointestinal: Negative for nausea, vomiting and diarrhea.   Genitourinary: Negative for dysuria, frequency and urgency.   Musculoskeletal: Negative for joint pain, joint swelling, muscle cramps and muscle ache.   Skin: Negative for color change, pale and rash.   Allergic/Immunologic: Negative for seasonal allergies.   Neurological: Negative for dizziness, history of vertigo, light-headedness, passing out and headaches.   Hematologic/Lymphatic: Negative for swollen lymph nodes, easy bruising/bleeding and history of blood clots. Does not bruise/bleed easily.   Psychiatric/Behavioral: Negative for nervous/anxious, sleep disturbance and depression. The patient is not  nervous/anxious.        Objective:      Physical Exam   Constitutional: He is oriented to person, place, and time. He appears well-developed and well-nourished. He is cooperative.  Non-toxic appearance. He does not have a sickly appearance. He does not appear ill. No distress.   HENT:   Head: Normocephalic and atraumatic.   Right Ear: Hearing, tympanic membrane, external ear and ear canal normal.   Left Ear: Hearing, tympanic membrane, external ear and ear canal normal.   Nose: Mucosal edema and rhinorrhea present. No nasal deformity. No epistaxis. Right sinus exhibits no maxillary sinus tenderness and no frontal sinus tenderness. Left sinus exhibits maxillary sinus tenderness and frontal sinus tenderness.   Mouth/Throat: Uvula is midline, oropharynx is clear and moist and mucous membranes are normal. No trismus in the jaw. Normal dentition. No uvula swelling. No oropharyngeal exudate, posterior oropharyngeal edema or posterior oropharyngeal erythema.   Purulent post nasal drip   Green nasal discharge   Eyes: Conjunctivae and lids are normal. No scleral icterus.   Neck: Trachea normal, full passive range of motion without pain and phonation normal. Neck supple. No neck rigidity. No edema and no erythema present.   Cardiovascular: Normal rate, regular rhythm, normal heart sounds, intact distal pulses and normal pulses.   Pulmonary/Chest: Effort normal and breath sounds normal. No respiratory distress. He has no decreased breath sounds. He has no rhonchi.   Abdominal: Normal appearance.   Musculoskeletal: Normal range of motion. He exhibits no edema or deformity.   Neurological: He is alert and oriented to person, place, and time. He exhibits normal muscle tone. Coordination normal.   Skin: Skin is warm, dry, intact, not diaphoretic and not pale.   Psychiatric: He has a normal mood and affect. His speech is normal and behavior is normal. Judgment and thought content normal. Cognition and memory are normal.   Nursing  note and vitals reviewed.        Assessment:       1. Acute bacterial sinusitis    2. Sinus headache        Plan:         Acute bacterial sinusitis  -     amoxicillin-clavulanate 875-125mg (AUGMENTIN) 875-125 mg per tablet; Take 1 tablet by mouth 2 (two) times daily. for 7 days  Dispense: 14 tablet; Refill: 0    Sinus headache  -     amoxicillin-clavulanate 875-125mg (AUGMENTIN) 875-125 mg per tablet; Take 1 tablet by mouth 2 (two) times daily. for 7 days  Dispense: 14 tablet; Refill: 0      Patient Instructions   Use an antihistamine such as Claritin, Zyrtec or Allegra to dry you out.     Patient given a wait and see antibiotic.  If symptoms are not improved or worsen in 72 hr start Augmentin twice a day for 7 days take with food.    Use Flonase 1-2 sprays/nostril per day. It is a local acting steroid nasal spray, if you develop a bloody nose, stop using the medication immediately.    You can try breathe right strips at night to help you breathe.  A cool mist humidifier in bedroom may help with cough and relieve stuffy nose.     Sinus rinses DO NOT USE TAP WATER, if you must, water must be a rolling boil for 1 minute, let it cool, then use.  May use distilled water, or over the counter nasal saline rinses.  Vics vapor rub in shower to help open nasal passages.  May use nasal gel to keep passages moisturized.  May use Nasal saline sprays during the day for added relief of congestion.   For those who go to the gym, please do not use the sauna or steam room now to clear sinuses.    During pollen season, change shirt if you are outside for a while when you go in.  Also wash your face.  Do not touch your face with your hands.  Wash your hands often in general while ill, avoid face contact with hands.     Over the counter you can use Tylenol (acetominophen) or Ibuprofen for your minor aches and pains as long as you have no contraindications.    Good nutrition. Lots of rest. Plenty of fluids.  Follow up with ENT or  allergist if symptoms persist or worsen.      Sinusitis (Antibiotic Treatment)    The sinuses are air-filled spaces within the bones of the face. They connect to the inside of the nose. Sinusitis is an inflammation of the tissue lining the sinus cavity. Sinus inflammation can occur during a cold. It can also be due to allergies to pollens and other particles in the air. Sinusitis can cause symptoms of sinus congestion and fullness. A sinus infection causes fever, headache and facial pain. There is often green or yellow drainage from the nose or into the back of the throat (post-nasal drip). You have been given antibiotics to treat this condition.  Home care:  · Take the full course of antibiotics as instructed. Do not stop taking them, even if you feel better.  · Drink plenty of water, hot tea, and other liquids. This may help thin mucus. It also may promote sinus drainage.  · Heat may help soothe painful areas of the face. Use a towel soaked in hot water. Or,  the shower and direct the hot spray onto your face. Using a vaporizer along with a menthol rub at night may also help.   · An expectorant containing guaifenesin may help thin the mucus and promote drainage from the sinuses.  · Over-the-counter decongestants may be used unless a similar medicine was prescribed. Nasal sprays work the fastest. Use one that contains phenylephrine or oxymetazoline. First blow the nose gently. Then use the spray. Do not use these medicines more often than directed on the label or symptoms may get worse. You may also use tablets containing pseudoephedrine. Avoid products that combine ingredients, because side effects may be increased. Read labels. You can also ask the pharmacist for help. (NOTE: Persons with high blood pressure should not use decongestants. They can raise blood pressure.)  · Over-the-counter antihistamines may help if allergies contributed to your sinusitis.    · Do not use nasal rinses or irrigation during  an acute sinus infection, unless told to by your health care provider. Rinsing may spread the infection to other sinuses.  · Use acetaminophen or ibuprofen to control pain, unless another pain medicine was prescribed. (If you have chronic liver or kidney disease or ever had a stomach ulcer, talk with your doctor before using these medicines. Aspirin should never be used in anyone under 18 years of age who is ill with a fever. It may cause severe liver damage.)  · Don't smoke. This can worsen symptoms.  Follow-up care  Follow up with your healthcare provider or our staff if you are not improving within the next week.  When to seek medical advice  Call your healthcare provider if any of these occur:  · Facial pain or headache becoming more severe  · Stiff neck  · Unusual drowsiness or confusion  · Swelling of the forehead or eyelids  · Vision problems, including blurred or double vision  · Fever of 100.4ºF (38ºC) or higher, or as directed by your healthcare provider  · Seizure  · Breathing problems  · Symptoms not resolving within 10 days  Date Last Reviewed: 4/13/2015  © 1112-0954 [employer]. [address] [place] [address], [place] [address]. All rights reserved. This information is not intended as a substitute for professional medical care. Always follow your healthcare professional's instructions.

## 2020-02-13 NOTE — PATIENT INSTRUCTIONS
Use an antihistamine such as Claritin, Zyrtec or Allegra to dry you out.     Patient given a wait and see antibiotic.  If symptoms are not improved or worsen in 72 hr start Augmentin twice a day for 7 days take with food.    Use Flonase 1-2 sprays/nostril per day. It is a local acting steroid nasal spray, if you develop a bloody nose, stop using the medication immediately.    You can try breathe right strips at night to help you breathe.  A cool mist humidifier in bedroom may help with cough and relieve stuffy nose.     Sinus rinses DO NOT USE TAP WATER, if you must, water must be a rolling boil for 1 minute, let it cool, then use.  May use distilled water, or over the counter nasal saline rinses.  Vics vapor rub in shower to help open nasal passages.  May use nasal gel to keep passages moisturized.  May use Nasal saline sprays during the day for added relief of congestion.   For those who go to the gym, please do not use the sauna or steam room now to clear sinuses.    During pollen season, change shirt if you are outside for a while when you go in.  Also wash your face.  Do not touch your face with your hands.  Wash your hands often in general while ill, avoid face contact with hands.     Over the counter you can use Tylenol (acetominophen) or Ibuprofen for your minor aches and pains as long as you have no contraindications.    Good nutrition. Lots of rest. Plenty of fluids.  Follow up with ENT or allergist if symptoms persist or worsen.      Sinusitis (Antibiotic Treatment)    The sinuses are air-filled spaces within the bones of the face. They connect to the inside of the nose. Sinusitis is an inflammation of the tissue lining the sinus cavity. Sinus inflammation can occur during a cold. It can also be due to allergies to pollens and other particles in the air. Sinusitis can cause symptoms of sinus congestion and fullness. A sinus infection causes fever, headache and facial pain. There is often green or yellow  drainage from the nose or into the back of the throat (post-nasal drip). You have been given antibiotics to treat this condition.  Home care:  · Take the full course of antibiotics as instructed. Do not stop taking them, even if you feel better.  · Drink plenty of water, hot tea, and other liquids. This may help thin mucus. It also may promote sinus drainage.  · Heat may help soothe painful areas of the face. Use a towel soaked in hot water. Or,  the shower and direct the hot spray onto your face. Using a vaporizer along with a menthol rub at night may also help.   · An expectorant containing guaifenesin may help thin the mucus and promote drainage from the sinuses.  · Over-the-counter decongestants may be used unless a similar medicine was prescribed. Nasal sprays work the fastest. Use one that contains phenylephrine or oxymetazoline. First blow the nose gently. Then use the spray. Do not use these medicines more often than directed on the label or symptoms may get worse. You may also use tablets containing pseudoephedrine. Avoid products that combine ingredients, because side effects may be increased. Read labels. You can also ask the pharmacist for help. (NOTE: Persons with high blood pressure should not use decongestants. They can raise blood pressure.)  · Over-the-counter antihistamines may help if allergies contributed to your sinusitis.    · Do not use nasal rinses or irrigation during an acute sinus infection, unless told to by your health care provider. Rinsing may spread the infection to other sinuses.  · Use acetaminophen or ibuprofen to control pain, unless another pain medicine was prescribed. (If you have chronic liver or kidney disease or ever had a stomach ulcer, talk with your doctor before using these medicines. Aspirin should never be used in anyone under 18 years of age who is ill with a fever. It may cause severe liver damage.)  · Don't smoke. This can worsen symptoms.  Follow-up  care  Follow up with your healthcare provider or our staff if you are not improving within the next week.  When to seek medical advice  Call your healthcare provider if any of these occur:  · Facial pain or headache becoming more severe  · Stiff neck  · Unusual drowsiness or confusion  · Swelling of the forehead or eyelids  · Vision problems, including blurred or double vision  · Fever of 100.4ºF (38ºC) or higher, or as directed by your healthcare provider  · Seizure  · Breathing problems  · Symptoms not resolving within 10 days  Date Last Reviewed: 4/13/2015  © 0392-8031 [employer]. [address] [place] [address]. All rights reserved. This information is not intended as a substitute for professional medical care. Always follow your healthcare professional's instructions.

## 2020-05-03 ENCOUNTER — OFFICE VISIT (OUTPATIENT)
Dept: URGENT CARE | Facility: CLINIC | Age: 35
End: 2020-05-03
Payer: COMMERCIAL

## 2020-05-03 VITALS
BODY MASS INDEX: 23.03 KG/M2 | HEIGHT: 72 IN | WEIGHT: 170 LBS | HEART RATE: 77 BPM | SYSTOLIC BLOOD PRESSURE: 93 MMHG | RESPIRATION RATE: 14 BRPM | DIASTOLIC BLOOD PRESSURE: 60 MMHG | OXYGEN SATURATION: 97 % | TEMPERATURE: 98 F

## 2020-05-03 DIAGNOSIS — S91.331A: Primary | ICD-10-CM

## 2020-05-03 PROCEDURE — 90715 TDAP VACCINE 7 YRS/> IM: CPT | Mod: S$GLB,,, | Performed by: INTERNAL MEDICINE

## 2020-05-03 PROCEDURE — 90715 TDAP VACCINE GREATER THAN OR EQUAL TO 7YO IM: ICD-10-PCS | Mod: S$GLB,,, | Performed by: INTERNAL MEDICINE

## 2020-05-03 PROCEDURE — 90471 TDAP VACCINE GREATER THAN OR EQUAL TO 7YO IM: ICD-10-PCS | Mod: S$GLB,,, | Performed by: INTERNAL MEDICINE

## 2020-05-03 PROCEDURE — 99214 OFFICE O/P EST MOD 30 MIN: CPT | Mod: 25,S$GLB,, | Performed by: INTERNAL MEDICINE

## 2020-05-03 PROCEDURE — 90471 IMMUNIZATION ADMIN: CPT | Mod: S$GLB,,, | Performed by: INTERNAL MEDICINE

## 2020-05-03 PROCEDURE — 99214 PR OFFICE/OUTPT VISIT, EST, LEVL IV, 30-39 MIN: ICD-10-PCS | Mod: 25,S$GLB,, | Performed by: INTERNAL MEDICINE

## 2020-05-03 RX ORDER — SULFAMETHOXAZOLE AND TRIMETHOPRIM 800; 160 MG/1; MG/1
1 TABLET ORAL 2 TIMES DAILY
Qty: 14 TABLET | Refills: 0 | Status: SHIPPED | OUTPATIENT
Start: 2020-05-03 | End: 2022-01-25

## 2020-05-03 NOTE — PROGRESS NOTES
Subjective:       Patient ID: Jarret Grant is a 34 y.o. male.    Vitals:  height is 6' (1.829 m) and weight is 77.1 kg (170 lb). His tympanic temperature is 98.3 °F (36.8 °C). His blood pressure is 93/60 and his pulse is 77. His respiration is 14 and oxygen saturation is 97%.     Chief Complaint: Puncture Wound (right foot )    Pt c/o right foot puncture wound; pt stepped on fence board with mariel nails today ; ~ 30 mins  Pt states his last tetanus was 2010    Foot Injury    The incident occurred less than 1 hour ago. The incident occurred at home. The pain is present in the right foot. The quality of the pain is described as stabbing. The pain is at a severity of 5/10. The pain is moderate. Pertinent negatives include no numbness or tingling. He reports no foreign bodies present. The symptoms are aggravated by palpation. He has tried ice for the symptoms.       Constitution: Negative for fatigue.   HENT: Negative for facial swelling and facial trauma.    Neck: Negative for neck stiffness.   Cardiovascular: Negative for chest trauma.   Eyes: Negative for eye trauma, double vision and blurred vision.   Gastrointestinal: Negative for abdominal trauma, abdominal pain and rectal bleeding.   Genitourinary: Negative for hematuria, genital trauma and pelvic pain.   Musculoskeletal: Negative for pain, trauma, joint swelling, abnormal ROM of joint and pain with walking.   Skin: Positive for puncture wound (righht foot ). Negative for color change, wound, abrasion and laceration.   Neurological: Negative for dizziness, history of vertigo, light-headedness, coordination disturbances, altered mental status, loss of consciousness and numbness.   Hematologic/Lymphatic: Negative for history of bleeding disorder.   Psychiatric/Behavioral: Negative for altered mental status.       Objective:      Physical Exam   Constitutional: He appears well-developed and well-nourished.   Skin:   2 puncture wounds soul of R foot ( estimated by  patient 2 cm deep)   Vitals reviewed.        Assessment:       1. Puncture wound of sole of right foot without complication, initial encounter        Plan:         Puncture wound of sole of right foot without complication, initial encounter  -     Tdap Vaccine  -     sulfamethoxazole-trimethoprim 800-160mg (BACTRIM DS) 800-160 mg Tab; Take 1 tablet by mouth 2 (two) times daily.  Dispense: 14 tablet; Refill: 0

## 2020-11-14 ENCOUNTER — OFFICE VISIT (OUTPATIENT)
Dept: URGENT CARE | Facility: CLINIC | Age: 35
End: 2020-11-14
Payer: COMMERCIAL

## 2020-11-14 VITALS
TEMPERATURE: 98 F | RESPIRATION RATE: 16 BRPM | OXYGEN SATURATION: 98 % | WEIGHT: 170 LBS | HEIGHT: 72 IN | HEART RATE: 78 BPM | DIASTOLIC BLOOD PRESSURE: 73 MMHG | SYSTOLIC BLOOD PRESSURE: 111 MMHG | BODY MASS INDEX: 23.03 KG/M2

## 2020-11-14 DIAGNOSIS — J02.9 PHARYNGITIS, UNSPECIFIED ETIOLOGY: ICD-10-CM

## 2020-11-14 DIAGNOSIS — R09.81 NASAL CONGESTION: Primary | ICD-10-CM

## 2020-11-14 LAB
CTP QC/QA: YES
SARS-COV-2 RDRP RESP QL NAA+PROBE: NEGATIVE

## 2020-11-14 PROCEDURE — 99203 OFFICE O/P NEW LOW 30 MIN: CPT | Mod: S$GLB,,, | Performed by: NURSE PRACTITIONER

## 2020-11-14 PROCEDURE — 3008F BODY MASS INDEX DOCD: CPT | Mod: CPTII,S$GLB,, | Performed by: NURSE PRACTITIONER

## 2020-11-14 PROCEDURE — 99203 PR OFFICE/OUTPT VISIT, NEW, LEVL III, 30-44 MIN: ICD-10-PCS | Mod: S$GLB,,, | Performed by: NURSE PRACTITIONER

## 2020-11-14 PROCEDURE — U0002 COVID-19 LAB TEST NON-CDC: HCPCS | Mod: QW,S$GLB,, | Performed by: NURSE PRACTITIONER

## 2020-11-14 PROCEDURE — 3008F PR BODY MASS INDEX (BMI) DOCUMENTED: ICD-10-PCS | Mod: CPTII,S$GLB,, | Performed by: NURSE PRACTITIONER

## 2020-11-14 PROCEDURE — U0002: ICD-10-PCS | Mod: QW,S$GLB,, | Performed by: NURSE PRACTITIONER

## 2020-11-14 RX ORDER — AZITHROMYCIN 250 MG/1
TABLET, FILM COATED ORAL
Qty: 6 TABLET | Refills: 0 | Status: SHIPPED | OUTPATIENT
Start: 2020-11-14 | End: 2022-01-25

## 2020-11-14 RX ORDER — BENZONATATE 100 MG/1
100 CAPSULE ORAL 3 TIMES DAILY PRN
Qty: 30 CAPSULE | Refills: 1 | Status: SHIPPED | OUTPATIENT
Start: 2020-11-14 | End: 2021-11-14

## 2020-11-14 NOTE — PROGRESS NOTES
Subjective:       Patient ID: Jarret Grant is a 35 y.o. male.    Vitals:  height is 6' (1.829 m) and weight is 77.1 kg (170 lb).     Chief Complaint: URI    URI   This is a new problem. The current episode started in the past 7 days. The problem has been unchanged. There has been no fever. Associated symptoms include congestion and sinus pain. Pertinent negatives include no coughing, ear pain, nausea, rash, sore throat, vomiting or wheezing. He has tried decongestant for the symptoms. The treatment provided mild relief.       Constitution: Negative for chills, sweating, fatigue and fever.   HENT: Positive for congestion, postnasal drip, sinus pain and sinus pressure. Negative for ear pain, sore throat and voice change.    Neck: Negative for painful lymph nodes.   Eyes: Negative for eye redness.   Respiratory: Negative for chest tightness, cough, sputum production, bloody sputum, COPD, shortness of breath, stridor, wheezing and asthma.    Gastrointestinal: Negative for nausea and vomiting.   Musculoskeletal: Negative for muscle ache.   Skin: Negative for rash.   Allergic/Immunologic: Negative for seasonal allergies and asthma.   Hematologic/Lymphatic: Negative for swollen lymph nodes.       Objective:      Physical Exam   Constitutional: He is oriented to person, place, and time. He appears well-developed. He is cooperative.  Non-toxic appearance. He does not appear ill. No distress.   HENT:   Head: Normocephalic and atraumatic.   Ears:   Right Ear: Hearing, tympanic membrane, external ear and ear canal normal.   Left Ear: Hearing, tympanic membrane, external ear and ear canal normal.   Nose: Nose normal. No mucosal edema, rhinorrhea or nasal deformity. No epistaxis. Right sinus exhibits no maxillary sinus tenderness and no frontal sinus tenderness. Left sinus exhibits no maxillary sinus tenderness and no frontal sinus tenderness.   Mouth/Throat: Uvula is midline and mucous membranes are normal. No trismus in the  jaw. Normal dentition. No uvula swelling. Posterior oropharyngeal edema and posterior oropharyngeal erythema present. No oropharyngeal exudate.   Eyes: Conjunctivae and lids are normal. No scleral icterus.   Neck: Trachea normal, full passive range of motion without pain and phonation normal. Neck supple. No neck rigidity. No edema and no erythema present.   Cardiovascular: Normal rate, regular rhythm, normal heart sounds and normal pulses.   Pulmonary/Chest: Effort normal and breath sounds normal. No respiratory distress. He has no decreased breath sounds. He has no rhonchi.   Abdominal: Normal appearance.   Musculoskeletal: Normal range of motion.         General: No deformity.   Neurological: He is alert and oriented to person, place, and time. He exhibits normal muscle tone. Coordination normal.   Skin: Skin is warm, dry, intact, not diaphoretic and not pale. Psychiatric: His speech is normal and behavior is normal. Judgment and thought content normal.   Nursing note and vitals reviewed.        Assessment:         phyarangitis   Plan:         Nasal congestion  -     POCT COVID-19 Rapid Screening    Jarret was seen today for uri.    Diagnoses and all orders for this visit:    Nasal congestion  -     POCT COVID-19 Rapid Screening      Results for orders placed or performed in visit on 11/14/20   POCT COVID-19 Rapid Screening   Result Value Ref Range    POC Rapid COVID Negative Negative     Acceptable Yes        Education  Pt has been given instructions populated from [employer] database and those entered into patient instructions field and has verbalized understanding of the after visit summary and information contained wherein.    Follow Up  .if no better 3-5 days fui with pcp    In Case of Emergency   May go to ER for acute shortness of breath, lightheadedness, fever, or any other emergent complaints or changes in condition.

## 2020-11-14 NOTE — PATIENT INSTRUCTIONS
When You Have a Sore Throat    A sore throat can be painful. There are many reasons why you may have a sore throat. Your healthcare provider will work with you to find the cause of your sore throat. He or she will also find the best treatment for you.  What causes a sore throat?  Sore throats can be caused or worsened by:  · Cold or flu viruses  · Bacteria  · Irritants such as tobacco smoke or air pollution  · Acid reflux  A healthy throat  The tonsils are on the sides of the throat near the base of the tongue. They collect viruses and bacteria and help fight infection. The throat (pharynx) is the passage for air. Mucus from the nasal cavity also moves down the passage.  An inflamed throat  The tonsils and pharynx can become inflamed due to a cold or flu virus. Postnasal drip (excess mucus draining from the nasal cavity) can irritate the throat. It can also make the throat or tonsils more likely to be infected by bacteria. Severe, untreated tonsillitis in children or adults can cause a pocket of pus (abscess) to form near the tonsil.  Your evaluation  A medical evaluation can help find the cause of your sore throat. It can also help your healthcare provider choose the best treatment for you. The evaluation may include a health history, physical exam, and diagnostic tests.  Health history  Your healthcare provider may ask you the following:  · How long has the sore throat lasted and how have you been treating it?  · Do you have any other symptoms, such as body aches, fever, or cough?  · Does your sore throat recur? If so, how often? How many days of school or work have you missed because of a sore throat?  · Do you have trouble eating or swallowing?  · Have you been told that you snore or have other sleep problems?  · Do you have bad breath?  · Do you cough up bad-tasting mucus?  Physical exam  During the exam, your healthcare provider checks your ears, nose, and throat for problems. He or she also checks for  "swelling in the neck, and may listen to your chest.  Possible tests  Other tests your healthcare provider may perform include:  · A throat swab to check for bacteria such as streptococcus (the bacteria that causes strep throat)  · A blood test to check for mononucleosis (a viral infection)  · A chest X-ray to rule out pneumonia, especially if you have a cough  Treating a sore throat  Treatment depends on many factors. What is the likely cause? Is the problem recent? Does it keep coming back? In many cases, the best thing to do is to treat the symptoms, rest, and let the problem heal itself. Antibiotics may help clear up some bacterial infections. For cases of severe or recurring tonsillitis, the tonsils may need to be removed.  Relieving your symptoms  · Dont smoke, and avoid secondhand smoke.  · For children, try throat sprays or Popsicles. Adults and older children may try lozenges.  · Drink warm liquids to soothe the throat and help thin mucus. Avoid alcohol, spicy foods, and acidic drinks such as orange juice. These can irritate the throat.  · Gargle with warm saltwater (1 teaspoon of salt to 8 ounces of warm water).  · Use a humidifier to keep air moist and relieve throat dryness.  · Try over-the-counter pain relievers such as acetaminophen or ibuprofen. Use as directed, and dont exceed the recommended dose. Dont give aspirin to children.   Are antibiotics needed?  If your sore throat is due to a bacterial infection, antibiotics may speed healing and prevent complications. Although group A streptococcus ("strep throat" or GAS) is the major treatable infection for a sore throat, GAS causes only 5% to 15% of sore throats in adults who seek medical care. Most sore throats are caused by cold or flu viruses. And antibiotics dont treat viral illness. In fact, using antibiotics when theyre not needed may produce bacteria that are harder to kill. Your healthcare provider will prescribe antibiotics only if he or " she thinks they are likely to help.  If antibiotics are prescribed  Take the medicine exactly as directed. Be sure to finish your prescription even if youre feeling better. And be sure to ask your healthcare provider or pharmacist what side effects are common and what to do about them.  Is surgery needed?  In some cases, tonsils need to be removed. This is often done as outpatient (same-day) surgery. Your healthcare provider may advise removing the tonsils in cases of:  · Several severe bouts of tonsillitis in a year. Severe episodes include those that lead to missed days of school or work, or that need to be treated with antibiotics.  · Tonsillitis that causes breathing problems during sleep  · Tonsillitis caused by food particles collecting in pouches in the tonsils (cryptic tonsillitis)  Call your healthcare provider if any of the following occur:  · Symptoms worsen, or new symptoms develop.  · Swollen tonsils make breathing difficult.  · The pain is severe enough to keep you from drinking liquids.  · A skin rash, hives, or wheezing develops. Any of these could signal an allergic reaction to antibiotics.  · Symptoms dont improve within a week.  · Symptoms dont improve within 2 to 3 days of starting antibiotics.   Date Last Reviewed: 10/1/2016  © 2846-5541 [employer]. [address] [place] [address], [place] [address]. All rights reserved. This information is not intended as a substitute for professional medical care. Always follow your healthcare professional's instructions.

## 2021-04-10 ENCOUNTER — OFFICE VISIT (OUTPATIENT)
Dept: URGENT CARE | Facility: CLINIC | Age: 36
End: 2021-04-10
Payer: COMMERCIAL

## 2021-04-10 VITALS
WEIGHT: 175 LBS | OXYGEN SATURATION: 97 % | HEIGHT: 72 IN | BODY MASS INDEX: 23.7 KG/M2 | SYSTOLIC BLOOD PRESSURE: 115 MMHG | HEART RATE: 95 BPM | TEMPERATURE: 99 F | DIASTOLIC BLOOD PRESSURE: 75 MMHG

## 2021-04-10 DIAGNOSIS — J06.9 VIRAL URI: Primary | ICD-10-CM

## 2021-04-10 LAB
CTP QC/QA: YES
SARS-COV-2 RDRP RESP QL NAA+PROBE: NEGATIVE

## 2021-04-10 PROCEDURE — U0002: ICD-10-PCS | Mod: QW,S$GLB,, | Performed by: PHYSICIAN ASSISTANT

## 2021-04-10 PROCEDURE — U0002 COVID-19 LAB TEST NON-CDC: HCPCS | Mod: QW,S$GLB,, | Performed by: PHYSICIAN ASSISTANT

## 2021-04-10 PROCEDURE — 99214 PR OFFICE/OUTPT VISIT, EST, LEVL IV, 30-39 MIN: ICD-10-PCS | Mod: S$GLB,,, | Performed by: PHYSICIAN ASSISTANT

## 2021-04-10 PROCEDURE — 99214 OFFICE O/P EST MOD 30 MIN: CPT | Mod: S$GLB,,, | Performed by: PHYSICIAN ASSISTANT

## 2021-04-10 PROCEDURE — 3008F PR BODY MASS INDEX (BMI) DOCUMENTED: ICD-10-PCS | Mod: CPTII,S$GLB,, | Performed by: PHYSICIAN ASSISTANT

## 2021-04-10 PROCEDURE — 3008F BODY MASS INDEX DOCD: CPT | Mod: CPTII,S$GLB,, | Performed by: PHYSICIAN ASSISTANT

## 2021-04-10 RX ORDER — PREDNISONE 20 MG/1
20 TABLET ORAL DAILY
Qty: 3 TABLET | Refills: 0 | Status: SHIPPED | OUTPATIENT
Start: 2021-04-10 | End: 2021-04-13

## 2021-04-13 ENCOUNTER — OFFICE VISIT (OUTPATIENT)
Dept: URGENT CARE | Facility: CLINIC | Age: 36
End: 2021-04-13
Payer: COMMERCIAL

## 2021-04-13 VITALS
BODY MASS INDEX: 24.5 KG/M2 | OXYGEN SATURATION: 96 % | SYSTOLIC BLOOD PRESSURE: 114 MMHG | HEIGHT: 71 IN | WEIGHT: 175 LBS | HEART RATE: 88 BPM | DIASTOLIC BLOOD PRESSURE: 75 MMHG | TEMPERATURE: 99 F

## 2021-04-13 DIAGNOSIS — J01.40 ACUTE PANSINUSITIS, RECURRENCE NOT SPECIFIED: Primary | ICD-10-CM

## 2021-04-13 DIAGNOSIS — J01.90 ACUTE BACTERIAL SINUSITIS: ICD-10-CM

## 2021-04-13 DIAGNOSIS — B96.89 ACUTE BACTERIAL SINUSITIS: ICD-10-CM

## 2021-04-13 PROCEDURE — 99214 PR OFFICE/OUTPT VISIT, EST, LEVL IV, 30-39 MIN: ICD-10-PCS | Mod: 25,S$GLB,, | Performed by: INTERNAL MEDICINE

## 2021-04-13 PROCEDURE — 96372 THER/PROPH/DIAG INJ SC/IM: CPT | Mod: S$GLB,,, | Performed by: INTERNAL MEDICINE

## 2021-04-13 PROCEDURE — 3008F PR BODY MASS INDEX (BMI) DOCUMENTED: ICD-10-PCS | Mod: CPTII,S$GLB,, | Performed by: INTERNAL MEDICINE

## 2021-04-13 PROCEDURE — 99214 OFFICE O/P EST MOD 30 MIN: CPT | Mod: 25,S$GLB,, | Performed by: INTERNAL MEDICINE

## 2021-04-13 PROCEDURE — 96372 PR INJECTION,THERAP/PROPH/DIAG2ST, IM OR SUBCUT: ICD-10-PCS | Mod: S$GLB,,, | Performed by: INTERNAL MEDICINE

## 2021-04-13 PROCEDURE — 3008F BODY MASS INDEX DOCD: CPT | Mod: CPTII,S$GLB,, | Performed by: INTERNAL MEDICINE

## 2021-04-13 RX ORDER — BETAMETHASONE SODIUM PHOSPHATE AND BETAMETHASONE ACETATE 3; 3 MG/ML; MG/ML
9 INJECTION, SUSPENSION INTRA-ARTICULAR; INTRALESIONAL; INTRAMUSCULAR; SOFT TISSUE
Status: COMPLETED | OUTPATIENT
Start: 2021-04-13 | End: 2021-04-13

## 2021-04-13 RX ORDER — AMOXICILLIN AND CLAVULANATE POTASSIUM 875; 125 MG/1; MG/1
1 TABLET, FILM COATED ORAL EVERY 12 HOURS
Qty: 20 TABLET | Refills: 0 | Status: SHIPPED | OUTPATIENT
Start: 2021-04-13 | End: 2021-04-23

## 2021-04-13 RX ADMIN — BETAMETHASONE SODIUM PHOSPHATE AND BETAMETHASONE ACETATE 9 MG: 3; 3 INJECTION, SUSPENSION INTRA-ARTICULAR; INTRALESIONAL; INTRAMUSCULAR; SOFT TISSUE at 07:04

## 2021-05-11 ENCOUNTER — OFFICE VISIT (OUTPATIENT)
Dept: URGENT CARE | Facility: CLINIC | Age: 36
End: 2021-05-11
Payer: COMMERCIAL

## 2021-05-11 VITALS
TEMPERATURE: 98 F | SYSTOLIC BLOOD PRESSURE: 107 MMHG | RESPIRATION RATE: 15 BRPM | WEIGHT: 175 LBS | BODY MASS INDEX: 24.5 KG/M2 | HEART RATE: 73 BPM | DIASTOLIC BLOOD PRESSURE: 74 MMHG | HEIGHT: 71 IN | OXYGEN SATURATION: 99 %

## 2021-05-11 DIAGNOSIS — J02.9 SORE THROAT: Primary | ICD-10-CM

## 2021-05-11 DIAGNOSIS — B35.3 TINEA PEDIS OF BOTH FEET: ICD-10-CM

## 2021-05-11 DIAGNOSIS — J01.40 ACUTE PANSINUSITIS, RECURRENCE NOT SPECIFIED: ICD-10-CM

## 2021-05-11 LAB
CTP QC/QA: YES
MOLECULAR STREP A: NEGATIVE

## 2021-05-11 PROCEDURE — 96372 THER/PROPH/DIAG INJ SC/IM: CPT | Mod: S$GLB,,, | Performed by: INTERNAL MEDICINE

## 2021-05-11 PROCEDURE — 3008F BODY MASS INDEX DOCD: CPT | Mod: CPTII,S$GLB,, | Performed by: INTERNAL MEDICINE

## 2021-05-11 PROCEDURE — 87651 STREP A DNA AMP PROBE: CPT | Mod: QW,S$GLB,, | Performed by: INTERNAL MEDICINE

## 2021-05-11 PROCEDURE — 99214 PR OFFICE/OUTPT VISIT, EST, LEVL IV, 30-39 MIN: ICD-10-PCS | Mod: 25,S$GLB,, | Performed by: INTERNAL MEDICINE

## 2021-05-11 PROCEDURE — 3008F PR BODY MASS INDEX (BMI) DOCUMENTED: ICD-10-PCS | Mod: CPTII,S$GLB,, | Performed by: INTERNAL MEDICINE

## 2021-05-11 PROCEDURE — 96372 PR INJECTION,THERAP/PROPH/DIAG2ST, IM OR SUBCUT: ICD-10-PCS | Mod: S$GLB,,, | Performed by: INTERNAL MEDICINE

## 2021-05-11 PROCEDURE — 87651 POCT STREP A MOLECULAR: ICD-10-PCS | Mod: QW,S$GLB,, | Performed by: INTERNAL MEDICINE

## 2021-05-11 PROCEDURE — 99214 OFFICE O/P EST MOD 30 MIN: CPT | Mod: 25,S$GLB,, | Performed by: INTERNAL MEDICINE

## 2021-05-11 RX ORDER — CLOTRIMAZOLE AND BETAMETHASONE DIPROPIONATE 10; .64 MG/G; MG/G
CREAM TOPICAL
Qty: 30 G | Refills: 1 | Status: SHIPPED | OUTPATIENT
Start: 2021-05-11

## 2021-05-11 RX ORDER — BETAMETHASONE SODIUM PHOSPHATE AND BETAMETHASONE ACETATE 3; 3 MG/ML; MG/ML
9 INJECTION, SUSPENSION INTRA-ARTICULAR; INTRALESIONAL; INTRAMUSCULAR; SOFT TISSUE
Status: COMPLETED | OUTPATIENT
Start: 2021-05-11 | End: 2021-05-11

## 2021-05-11 RX ADMIN — BETAMETHASONE SODIUM PHOSPHATE AND BETAMETHASONE ACETATE 9 MG: 3; 3 INJECTION, SUSPENSION INTRA-ARTICULAR; INTRALESIONAL; INTRAMUSCULAR; SOFT TISSUE at 03:05

## 2021-06-19 ENCOUNTER — OFFICE VISIT (OUTPATIENT)
Dept: URGENT CARE | Facility: CLINIC | Age: 36
End: 2021-06-19
Payer: COMMERCIAL

## 2021-06-19 VITALS
RESPIRATION RATE: 14 BRPM | TEMPERATURE: 99 F | BODY MASS INDEX: 23.8 KG/M2 | SYSTOLIC BLOOD PRESSURE: 113 MMHG | WEIGHT: 170 LBS | HEIGHT: 71 IN | OXYGEN SATURATION: 97 % | HEART RATE: 74 BPM | DIASTOLIC BLOOD PRESSURE: 76 MMHG

## 2021-06-19 DIAGNOSIS — R51.9 SINUS HEADACHE: ICD-10-CM

## 2021-06-19 DIAGNOSIS — Z20.822 LAB TEST NEGATIVE FOR COVID-19 VIRUS: ICD-10-CM

## 2021-06-19 DIAGNOSIS — J32.9 SINUSITIS, UNSPECIFIED CHRONICITY, UNSPECIFIED LOCATION: ICD-10-CM

## 2021-06-19 DIAGNOSIS — R09.81 SINUS CONGESTION: ICD-10-CM

## 2021-06-19 DIAGNOSIS — R09.89 RUNNY NOSE: Primary | ICD-10-CM

## 2021-06-19 DIAGNOSIS — Z11.52 ENCOUNTER FOR SCREENING LABORATORY TESTING FOR COVID-19 VIRUS: ICD-10-CM

## 2021-06-19 DIAGNOSIS — J34.89 SINUS PRESSURE: ICD-10-CM

## 2021-06-19 DIAGNOSIS — Z88.9 HISTORY OF SEASONAL ALLERGIES: ICD-10-CM

## 2021-06-19 LAB
CTP QC/QA: YES
SARS-COV-2 RDRP RESP QL NAA+PROBE: NEGATIVE

## 2021-06-19 PROCEDURE — 99214 PR OFFICE/OUTPT VISIT, EST, LEVL IV, 30-39 MIN: ICD-10-PCS | Mod: 25,S$GLB,CS, | Performed by: PHYSICIAN ASSISTANT

## 2021-06-19 PROCEDURE — 96372 PR INJECTION,THERAP/PROPH/DIAG2ST, IM OR SUBCUT: ICD-10-PCS | Mod: S$GLB,,, | Performed by: PHYSICIAN ASSISTANT

## 2021-06-19 PROCEDURE — U0002: ICD-10-PCS | Mod: QW,S$GLB,, | Performed by: PHYSICIAN ASSISTANT

## 2021-06-19 PROCEDURE — 99214 OFFICE O/P EST MOD 30 MIN: CPT | Mod: 25,S$GLB,CS, | Performed by: PHYSICIAN ASSISTANT

## 2021-06-19 PROCEDURE — 3008F BODY MASS INDEX DOCD: CPT | Mod: CPTII,S$GLB,, | Performed by: PHYSICIAN ASSISTANT

## 2021-06-19 PROCEDURE — U0002 COVID-19 LAB TEST NON-CDC: HCPCS | Mod: QW,S$GLB,, | Performed by: PHYSICIAN ASSISTANT

## 2021-06-19 PROCEDURE — 3008F PR BODY MASS INDEX (BMI) DOCUMENTED: ICD-10-PCS | Mod: CPTII,S$GLB,, | Performed by: PHYSICIAN ASSISTANT

## 2021-06-19 PROCEDURE — 96372 THER/PROPH/DIAG INJ SC/IM: CPT | Mod: S$GLB,,, | Performed by: PHYSICIAN ASSISTANT

## 2021-06-19 RX ORDER — DEXAMETHASONE SODIUM PHOSPHATE 4 MG/ML
4 INJECTION, SOLUTION INTRA-ARTICULAR; INTRALESIONAL; INTRAMUSCULAR; INTRAVENOUS; SOFT TISSUE
Status: DISCONTINUED | OUTPATIENT
Start: 2021-06-19 | End: 2021-06-19

## 2021-06-19 RX ORDER — AMOXICILLIN 500 MG/1
500 TABLET, FILM COATED ORAL 2 TIMES DAILY
Qty: 10 TABLET | Refills: 0 | Status: SHIPPED | OUTPATIENT
Start: 2021-06-21 | End: 2021-06-26

## 2021-06-19 RX ORDER — DEXAMETHASONE SODIUM PHOSPHATE 4 MG/ML
4 INJECTION, SOLUTION INTRA-ARTICULAR; INTRALESIONAL; INTRAMUSCULAR; INTRAVENOUS; SOFT TISSUE
Status: COMPLETED | OUTPATIENT
Start: 2021-06-19 | End: 2021-06-19

## 2021-06-19 RX ADMIN — DEXAMETHASONE SODIUM PHOSPHATE 4 MG: 4 INJECTION, SOLUTION INTRA-ARTICULAR; INTRALESIONAL; INTRAMUSCULAR; INTRAVENOUS; SOFT TISSUE at 10:06

## 2021-10-01 ENCOUNTER — PATIENT MESSAGE (OUTPATIENT)
Dept: ADMINISTRATIVE | Facility: CLINIC | Age: 36
End: 2021-10-01

## 2021-10-01 ENCOUNTER — INFUSION (OUTPATIENT)
Dept: INFECTIOUS DISEASES | Facility: HOSPITAL | Age: 36
End: 2021-10-01
Attending: INTERNAL MEDICINE
Payer: COMMERCIAL

## 2021-10-01 ENCOUNTER — OFFICE VISIT (OUTPATIENT)
Dept: URGENT CARE | Facility: CLINIC | Age: 36
End: 2021-10-01
Payer: COMMERCIAL

## 2021-10-01 VITALS
HEART RATE: 82 BPM | RESPIRATION RATE: 18 BRPM | SYSTOLIC BLOOD PRESSURE: 104 MMHG | OXYGEN SATURATION: 99 % | TEMPERATURE: 98 F | DIASTOLIC BLOOD PRESSURE: 60 MMHG

## 2021-10-01 VITALS
RESPIRATION RATE: 16 BRPM | WEIGHT: 175 LBS | SYSTOLIC BLOOD PRESSURE: 104 MMHG | TEMPERATURE: 99 F | HEIGHT: 72 IN | DIASTOLIC BLOOD PRESSURE: 70 MMHG | OXYGEN SATURATION: 98 % | HEART RATE: 83 BPM | BODY MASS INDEX: 23.7 KG/M2

## 2021-10-01 DIAGNOSIS — Z20.822 CLOSE EXPOSURE TO COVID-19 VIRUS: ICD-10-CM

## 2021-10-01 DIAGNOSIS — U07.1 COVID-19: Primary | ICD-10-CM

## 2021-10-01 DIAGNOSIS — U07.1 COVID-19 VIRUS DETECTED: Primary | ICD-10-CM

## 2021-10-01 DIAGNOSIS — R09.89 RUNNY NOSE: ICD-10-CM

## 2021-10-01 LAB
CTP QC/QA: YES
SARS-COV-2 RDRP RESP QL NAA+PROBE: POSITIVE

## 2021-10-01 PROCEDURE — M0243 CASIRIVI AND IMDEVI INFUSION: HCPCS | Performed by: INTERNAL MEDICINE

## 2021-10-01 PROCEDURE — 3074F PR MOST RECENT SYSTOLIC BLOOD PRESSURE < 130 MM HG: ICD-10-PCS | Mod: CPTII,S$GLB,, | Performed by: NURSE PRACTITIONER

## 2021-10-01 PROCEDURE — 1159F MED LIST DOCD IN RCRD: CPT | Mod: CPTII,S$GLB,, | Performed by: NURSE PRACTITIONER

## 2021-10-01 PROCEDURE — 3078F DIAST BP <80 MM HG: CPT | Mod: CPTII,S$GLB,, | Performed by: NURSE PRACTITIONER

## 2021-10-01 PROCEDURE — U0002: ICD-10-PCS | Mod: QW,S$GLB,, | Performed by: NURSE PRACTITIONER

## 2021-10-01 PROCEDURE — 99212 PR OFFICE/OUTPT VISIT, EST, LEVL II, 10-19 MIN: ICD-10-PCS | Mod: S$GLB,,, | Performed by: NURSE PRACTITIONER

## 2021-10-01 PROCEDURE — 3074F SYST BP LT 130 MM HG: CPT | Mod: CPTII,S$GLB,, | Performed by: NURSE PRACTITIONER

## 2021-10-01 PROCEDURE — 3078F PR MOST RECENT DIASTOLIC BLOOD PRESSURE < 80 MM HG: ICD-10-PCS | Mod: CPTII,S$GLB,, | Performed by: NURSE PRACTITIONER

## 2021-10-01 PROCEDURE — 1159F PR MEDICATION LIST DOCUMENTED IN MEDICAL RECORD: ICD-10-PCS | Mod: CPTII,S$GLB,, | Performed by: NURSE PRACTITIONER

## 2021-10-01 PROCEDURE — 63600175 PHARM REV CODE 636 W HCPCS: Performed by: INTERNAL MEDICINE

## 2021-10-01 PROCEDURE — U0002 COVID-19 LAB TEST NON-CDC: HCPCS | Mod: QW,S$GLB,, | Performed by: NURSE PRACTITIONER

## 2021-10-01 PROCEDURE — 99212 OFFICE O/P EST SF 10 MIN: CPT | Mod: S$GLB,,, | Performed by: NURSE PRACTITIONER

## 2021-10-01 PROCEDURE — 25000003 PHARM REV CODE 250: Performed by: INTERNAL MEDICINE

## 2021-10-01 PROCEDURE — 3008F PR BODY MASS INDEX (BMI) DOCUMENTED: ICD-10-PCS | Mod: CPTII,S$GLB,, | Performed by: NURSE PRACTITIONER

## 2021-10-01 PROCEDURE — 3008F BODY MASS INDEX DOCD: CPT | Mod: CPTII,S$GLB,, | Performed by: NURSE PRACTITIONER

## 2021-10-01 RX ORDER — DIPHENHYDRAMINE HYDROCHLORIDE 50 MG/ML
25 INJECTION INTRAMUSCULAR; INTRAVENOUS ONCE AS NEEDED
Status: DISCONTINUED | OUTPATIENT
Start: 2021-10-01 | End: 2022-01-14

## 2021-10-01 RX ORDER — SODIUM CHLORIDE 0.9 % (FLUSH) 0.9 %
10 SYRINGE (ML) INJECTION
Status: DISCONTINUED | OUTPATIENT
Start: 2021-10-01 | End: 2022-01-25

## 2021-10-01 RX ORDER — ACETAMINOPHEN 325 MG/1
650 TABLET ORAL ONCE AS NEEDED
Status: DISCONTINUED | OUTPATIENT
Start: 2021-10-01 | End: 2022-01-25

## 2021-10-01 RX ORDER — ALBUTEROL SULFATE 90 UG/1
2 AEROSOL, METERED RESPIRATORY (INHALATION)
Status: DISCONTINUED | OUTPATIENT
Start: 2021-10-01 | End: 2022-01-25

## 2021-10-01 RX ORDER — ONDANSETRON 4 MG/1
4 TABLET, ORALLY DISINTEGRATING ORAL ONCE AS NEEDED
Status: DISCONTINUED | OUTPATIENT
Start: 2021-10-01 | End: 2022-01-25

## 2021-10-01 RX ORDER — EPINEPHRINE 0.3 MG/.3ML
0.3 INJECTION SUBCUTANEOUS
Status: DISCONTINUED | OUTPATIENT
Start: 2021-10-01 | End: 2022-01-25

## 2021-10-01 RX ADMIN — CASIRIVIMAB AND IMDEVIMAB 600 MG: 600; 600 INJECTION, SOLUTION, CONCENTRATE INTRAVENOUS at 11:10

## 2021-10-02 ENCOUNTER — NURSE TRIAGE (OUTPATIENT)
Dept: ADMINISTRATIVE | Facility: CLINIC | Age: 36
End: 2021-10-02

## 2022-01-14 ENCOUNTER — OFFICE VISIT (OUTPATIENT)
Dept: URGENT CARE | Facility: CLINIC | Age: 37
End: 2022-01-14
Payer: COMMERCIAL

## 2022-01-14 VITALS
OXYGEN SATURATION: 98 % | HEART RATE: 91 BPM | WEIGHT: 175 LBS | RESPIRATION RATE: 18 BRPM | SYSTOLIC BLOOD PRESSURE: 135 MMHG | DIASTOLIC BLOOD PRESSURE: 83 MMHG | BODY MASS INDEX: 23.7 KG/M2 | HEIGHT: 72 IN | TEMPERATURE: 98 F

## 2022-01-14 DIAGNOSIS — J02.9 SORE THROAT: ICD-10-CM

## 2022-01-14 DIAGNOSIS — J06.9 UPPER RESPIRATORY TRACT INFECTION, UNSPECIFIED TYPE: ICD-10-CM

## 2022-01-14 DIAGNOSIS — R05.9 COUGH: Primary | ICD-10-CM

## 2022-01-14 LAB
CTP QC/QA: YES
SARS-COV-2 RDRP RESP QL NAA+PROBE: NEGATIVE

## 2022-01-14 PROCEDURE — 3079F DIAST BP 80-89 MM HG: CPT | Mod: CPTII,S$GLB,, | Performed by: NURSE PRACTITIONER

## 2022-01-14 PROCEDURE — 1160F RVW MEDS BY RX/DR IN RCRD: CPT | Mod: CPTII,S$GLB,, | Performed by: NURSE PRACTITIONER

## 2022-01-14 PROCEDURE — 3008F PR BODY MASS INDEX (BMI) DOCUMENTED: ICD-10-PCS | Mod: CPTII,S$GLB,, | Performed by: NURSE PRACTITIONER

## 2022-01-14 PROCEDURE — U0002: ICD-10-PCS | Mod: QW,S$GLB,, | Performed by: NURSE PRACTITIONER

## 2022-01-14 PROCEDURE — 1160F PR REVIEW ALL MEDS BY PRESCRIBER/CLIN PHARMACIST DOCUMENTED: ICD-10-PCS | Mod: CPTII,S$GLB,, | Performed by: NURSE PRACTITIONER

## 2022-01-14 PROCEDURE — 1159F PR MEDICATION LIST DOCUMENTED IN MEDICAL RECORD: ICD-10-PCS | Mod: CPTII,S$GLB,, | Performed by: NURSE PRACTITIONER

## 2022-01-14 PROCEDURE — 3075F SYST BP GE 130 - 139MM HG: CPT | Mod: CPTII,S$GLB,, | Performed by: NURSE PRACTITIONER

## 2022-01-14 PROCEDURE — 3008F BODY MASS INDEX DOCD: CPT | Mod: CPTII,S$GLB,, | Performed by: NURSE PRACTITIONER

## 2022-01-14 PROCEDURE — 99214 PR OFFICE/OUTPT VISIT, EST, LEVL IV, 30-39 MIN: ICD-10-PCS | Mod: S$GLB,,, | Performed by: NURSE PRACTITIONER

## 2022-01-14 PROCEDURE — U0002 COVID-19 LAB TEST NON-CDC: HCPCS | Mod: QW,S$GLB,, | Performed by: NURSE PRACTITIONER

## 2022-01-14 PROCEDURE — 1159F MED LIST DOCD IN RCRD: CPT | Mod: CPTII,S$GLB,, | Performed by: NURSE PRACTITIONER

## 2022-01-14 PROCEDURE — 3075F PR MOST RECENT SYSTOLIC BLOOD PRESS GE 130-139MM HG: ICD-10-PCS | Mod: CPTII,S$GLB,, | Performed by: NURSE PRACTITIONER

## 2022-01-14 PROCEDURE — 3079F PR MOST RECENT DIASTOLIC BLOOD PRESSURE 80-89 MM HG: ICD-10-PCS | Mod: CPTII,S$GLB,, | Performed by: NURSE PRACTITIONER

## 2022-01-14 PROCEDURE — 99214 OFFICE O/P EST MOD 30 MIN: CPT | Mod: S$GLB,,, | Performed by: NURSE PRACTITIONER

## 2022-01-14 RX ORDER — BENZONATATE 100 MG/1
100 CAPSULE ORAL 3 TIMES DAILY PRN
Qty: 30 CAPSULE | Refills: 1 | Status: SHIPPED | OUTPATIENT
Start: 2022-01-14 | End: 2022-01-25

## 2022-01-14 NOTE — PATIENT INSTRUCTIONS
"You have been tested for the COVID-19; we will swab you and let you go home then call you should the results be positive, you will also be able to see your results in real-time on My Chart. There is no reason for you to extend your stay here as I can order antibodies  afterwards as well.Your test results for COVID-19  are pending.                                                              URI   If your condition worsens or fails to improve we recommend that you receive another evaluation at the ER immediately or contact your PCP to discuss your concerns or return here. You must understand that you've received an urgent care treatment only and that you may be released before all your medical problems are known or treated. You the patient will arrange for [facility] care as instructed.   If we discussed that I think your illness is viral, it will not respond to antibiotics and will last 5-7 days. If we discussed "wait and see" antibiotics and if over the next few days the symptoms worsen start the antibiotics I have given you.   -  If you are female and on BCP and do take the antibiotics, use additional methods to prevent pregnancy while on the antibiotics and for one cycle after.   -  Flonase (fluticasone) is a nasal spray which is available over the counter and may help with your symptoms.   -  Zyrtec D, Claritin D or Allegra D can also help with symptoms of congestion and drainage.   -  If you have hypertension avoid using the "D" which is the decongestant.  Instead you can use Coricidin HBP for cold and cough symptoms.    -  If you just have drainage you can take plain Zyrtec, Claritin or Allegra   -  If you just have a congested feeling you can take pseudoephedrine (unless you have high blood pressure) which you have to sign for behind the counter. Do not buy the phenylephrine which is on the shelf as it is not effective   -  Rest and fluids are also important.   -  Tylenol or ibuprofen can also be used as " directed for pain unless you have an allergy to them or medical condition such as stomach ulcers, kidney or liver disease or blood thinners etc for which you should not be taking these type of medications.   -  If you are flying in the next few days Afrin nose drops for the airplane flight upon take off and landing may help. Other than at those times refrain from using afrin.   - If you were prescribed a narcotic do not drive or operate heavy machinery while taking these medications.       Patient Education       Cough Discharge Instructions, Adult   About this topic   Many things can cause a cough. A cold or allergies can cause a cough. Other times, asthma or smoking can cause your cough. You can have a cough from mucus that drips down the back of your throat or from stomach acid that backs up into your throat. Sometimes a cough is a side effect from a drug. You may be waiting on some test results. If so, the staff will contact you if there are concerning results.  What care is needed at home?   · Ask your doctor what you need to do when you go home. Make sure you ask questions if you do not understand what the doctor says.  · To help you feel better:  ? Use a cool mist humidifier to avoid breathing dry air.  ? Use hard candy or cough drops to soothe sore throat and cough.  ? Gargle with salt water (mix 1/2 teaspoon salt with 1 cup warm water) a few times a day.  ? Spray saltwater mist in each nostril. Any normal saline spray works.  ? Sip warm liquids to keep your throat moist.  ? Take warm, steamy showers to help soothe the cough.  · Do not smoke or be in smoke-filled places. Avoid things that may cause breathing problems like vaping, fumes, pollution, dust, and other common allergens.  · You may want to use over-the-counter medicines for allergies or acid reflux if your cough is due to one of these problems.  · You can also use an over-the-counter cough medicine.  What follow-up care is needed?   Your doctor may  ask you to make visits to the office to check on your progress. Be sure to keep these visits.  What drugs may be needed?   The doctor may order drugs to:  · Control coughing  · Get rid of or thin mucus  · Block allergens if your cough is due to allergies  · Increase airflow if your cough is due to asthma or COPD  · Reduce swelling of the airways  · Reduce stomach acid if your cough is due to stomach acid problems  Will physical activity be limited?   Your cough may interfere with some of your activities.  What changes to diet are needed?   Some people feel milk products worsen their sputum production and worsen their cough. There is no proof that this is true. There is no need to reduce milk intake. Honey has been shown to be as effective as the leading over-the-counter (OTC) drug for calming coughs. Use 1/2 to 1 teaspoon (2.5 mL to 5 mL) of honey as needed. It can thin the secretions and loosen the cough. Never give honey to a child under the age of 1.  What can be done to prevent this health problem?   · Wash your hands often with soap and water for at least 20 seconds, especially after coughing or sneezing. Alcohol-based hand sanitizers also work to kill the virus.       · If you are sick, cover your mouth and nose with tissue when you cough or sneeze. You can also cough into your elbow. Throw away tissues in the trash and wash your hands after touching used tissues.  · Clean items and surfaces you most often touch like door handles, remotes, phones, or toys. Wipe them with a disinfectant. This can help reduce the spread of infection.  · Do not get too close (kissing, hugging) to people who are sick.  · Do not share towels or hankies with anyone who is sick.  · Stay away from crowded places.  · Get a flu shot each year.     When do I need to call the doctor?   · You have chest pain when you cough or trouble breathing.  · You start to cough up blood or yellow or green mucus.  · You have a fever of 100.4°F (38°C)  or higher or chills.  · You cough so hard you throw up.  · You are still coughing in 10 days.  Teach Back: Helping You Understand   The Teach Back Method helps you understand the information we are giving you. After you talk with the staff, tell them in your own words what you learned. This helps to make sure the staff has described each thing clearly. It also helps to explain things that may have been confusing. Before going home, make sure you can do these:  · I can tell you about my condition.  · I can tell you what I can do to help thin the mucus and ease my cough.  · I can tell you what I will do if I have wheezing, chest tightness, my lips turn blue with coughing, or I have other trouble breathing.  Where can I learn more?   American Academy of [employer]icians  https://familydoctor.org/cough-medicine-understanding-your-otc-options/   NHS Choices  https://www.nhs.uk/conditions/cough/   Last Reviewed Date   2021-06-10  Consumer Information Use and Disclaimer   This information is not specific medical advice and does not replace information you receive from your health care provider. This is only a brief summary of general information. It does NOT include all information about conditions, illnesses, injuries, tests, procedures, treatments, therapies, discharge instructions or life-style choices that may apply to you. You must talk with your health care provider for complete information about your health and treatment options. This information should not be used to decide whether or not to accept your health care providers advice, instructions or recommendations. Only your health care provider has the knowledge and training to provide advice that is right for you.  Copyright   Copyright © 2021 [employer] and its affiliates and/or licensors. All rights reserved.  Patient Education       Sore Throat Discharge Instructions, Adult   About this topic   Swelling at the back of your throat is called pharyngitis.  Swelling of your throat and tonsils is tonsillopharyngitis. Both are commonly called a sore throat. Your sore throat is likely caused by a virus. Most of the time, a sore throat will go away without antibiotics in a week or two.           What care is needed at home?   · Ask your doctor what you need to do when you go home. Make sure you ask questions if you do not understand what the doctor says. This way you will know what you need to do.  · To help ease a sore throat you can:  ? Use a sore throat spray.  ? Suck on hard candy or throat lozenges.  ? Gargle with warm saltwater a few times each day. Mix of 1/4 teaspoon (1.25 grams) salt in 8 ounces (240 mL) of warm water.  ? Use a cool mist humidifier to help you breathe easier.  · You may want to take medicine like acetaminophen, ibuprofen, or naproxen for pain.  · If you decide to take over-the-counter cough or cold medicines, follow the directions on the label carefully. Be sure you do not take more than one medicine that contains acetaminophen. Also, if you have a heart problem or high blood pressure, check with your doctor before you take any of these medicines.  · Wash your hands often. This will help keep others healthy.  What follow-up care is needed?   Your doctor may ask you to make visits to the office to check on your progress. Be sure to keep these visits.  What drugs may be needed?   Take your drugs as ordered by your doctor. Do not skip doses or stop when you feel better. The doctor may order drugs to:  · Prevent or fight an infection  · Help with pain  · Lower fever  · Help nasal congestion and runny nose  · Soothe the throat  Will physical activity be limited?   You may need to rest at home for 1 to 2 days or until you are feeling well.  What changes to diet are needed?   If your throat feels too sore to eat solid foods you may drink juice, milk, milkshakes, or soups. Talk to your doctor about what diet is proper for your condition.  What can be done  to prevent this health problem?   · Wash your hands often. Be sure to wash after you blow your nose or take care of others with a sore throat.  · Do not share utensils and drinking glasses with someone who has a sore throat. Wash these objects with hot, soapy water.  · Do not share your foods or drinks with others while you are sick. You might infect them.  · Throw away used tissues right away and then wash your hands.  · Get a new toothbrush after signs are gone or you are done with your antibiotics.  When do I need to call the doctor?   · You have trouble breathing.  · Your neck, tongue, or throat is swelling.  · You are drooling because you cannot swallow your saliva.  · You have very bad pain in your throat that keeps you from eating or drinking anything.  · There are large, painful lumps in your neck.  · You have blisters in the back of your throat.  Teach Back: Helping You Understand   The Teach Back Method helps you understand the information we are giving you. After you talk with the staff, tell them in your own words what you learned. This helps to make sure the staff has described each thing clearly. It also helps to explain things that may have been confusing. Before going home, make sure you can do these:  · I can tell you about my condition.  · I can tell you what may help ease my pain.  · I can tell you what I will do if I have trouble breathing or swallowing or have large painful lumps in my throat.  Where can I learn more?   Centers for Disease Control and Prevention  https://www.cdc.gov/antibiotic-use/community/for-patients/common-illnesses/sore-throat.html   Ministry of Health  https://www.health.govt.nz/your-health/conditions-and-treatments/diseases-and-illnesses/sore-throat   NHS Choices  https://www.nhs.uk/conditions/sore-throat/   Last Reviewed Date   2021-06-08  Consumer Information Use and Disclaimer   This information is not specific medical advice and does not replace information you  receive from your health care provider. This is only a brief summary of general information. It does NOT include all information about conditions, illnesses, injuries, tests, procedures, treatments, therapies, discharge instructions or life-style choices that may apply to you. You must talk with your health care provider for complete information about your health and treatment options. This information should not be used to decide whether or not to accept your health care providers advice, instructions or recommendations. Only your health care provider has the knowledge and training to provide advice that is right for you.  Copyright   Copyright © 2021 [employer] and its affiliates and/or licensors. All rights reserved.  Patient Education       Viral Upper Respiratory Infection Discharge Instructions, Adult   About this topic   You have an upper respiratory infection or URI. A URI can affect your nose, throat, ears, and sinuses. A virus is the cause of almost all URIs and antibiotics will not help you feel better more quickly. The common cold is an example of a viral URI.  URIs are easy to spread from person to person, most often through coughing or sneezing. A URI will almost always get better in a week or two without any treatment.         What care is needed at home?   · Ask your doctor what you need to do when you go home. Make sure you ask questions if you do not understand what the doctor says.  · If you smoke, try to quit. Your doctor or nurse can help.  · Drink lots of fluids like water, juice, or broth. This will help replace any fluids lost if you have a runny nose or fever. Warm tea or soup can help soothe a sore throat.  · If the air in your home feels dry, use a cool mist humidifier. This can help a stuffy nose and make it easier to breathe.  · You can also use saline nose drops to relieve stuffiness.  · If you decide to take over-the-counter cough or cold medicines, follow the directions on the  label carefully. Be sure you do not take more than 1 medicine that contains acetaminophen. Also, if you have a heart problem or high blood pressure, check with your doctor before you take any of these medicines.  · Wash your hands often. Cough or sneeze into a tissue or your elbow instead of your hands. This will help keep others healthy.  What follow-up care is needed?   Your doctor may ask you to make visits to the office to check on your progress. Be sure to keep these visits.  What drugs may be needed?   The doctor may order drugs to:  · Open up the tubes of your lungs  · Treat viral infection  · Relieve or stop coughing  · Help with pain from a sore throat  · Relieve runny and stuffy nose  · Provide oxygen  Will physical activity be limited?   You need to rest for a few days to let your body recover from the infection.  What changes to diet are needed?   Eat soft foods like soup if swallowing is too painful.  What problems could happen?   · Asthma attack  · Sinus infections  · Lung problems like pneumonia and bronchitis  · Severe fluid loss. This is dehydration.  What can be done to prevent this health problem?   · Wash your hands often with soap and water for at least 20 seconds, especially after coughing or sneezing. Alcohol-based hand sanitizers also work to kill the virus.  · If you are sick, cover your mouth and nose with tissue when you cough or sneeze. You can also cough into your elbow. Throw away tissues in the trash and wash your hands after touching used tissues.  · Do not get too close (kissing, hugging) to people who are sick.  · Do not share towels or hankies with anyone who is sick. Clean commonly handled things like door handles, remotes, toys, and phones. Wipe them with a disinfectant.  · Stay away from crowded places.  · Cover your nose and mouth when you sneeze or cough.  · Take vitamin C to help build up your body's ability to fight disease.  · Get a flu shot each year.  When do I need to  call the doctor?   · You have trouble breathing when talking or sitting still.  · You have a fever of 100.4°F (38°C) or higher for several days, chills, a very bad sore throat, or ear or sinus pain.  · You develop a new fever after several days of feeling the same or improving.  · You develop chest pain when you cough.  · You have a cough that lasts more than 10 days.  · You cough up blood, or the color of the mucus you cough up changes.  Teach Back: Helping You Understand   The Teach Back Method helps you understand the information we are giving you. After you talk with the staff, tell them in your own words what you learned. This helps to make sure the staff has described each thing clearly. It also helps to explain things that may have been confusing. Before going home, make sure you can do these:  · I can tell you about my condition.  · I can tell you what may help ease my signs.  · I can tell you what I will do if I have a fever, chills, breathing very fast, or trouble breathing.  Where can I learn more?   American Lung Association  https://www.lung.org/blog/can-you-exercise-with-a-cold   American Lung Association  https://www.lung.org/lung-health-diseases/lung-disease-lookup/influenza/facts-about-the-common-cold   NHS Choices  https://www.nhs.uk/conditions/respiratory-tract-infection/   [employer]ToDate  https://www.[facility].com/contents/the-common-cold-in-adults-beyond-the-basics   Last Reviewed Date   2021-06-08  Consumer Information Use and Disclaimer   This information is not specific medical advice and does not replace information you receive from your health care provider. This is only a brief summary of general information. It does NOT include all information about conditions, illnesses, injuries, tests, procedures, treatments, therapies, discharge instructions or life-style choices that may apply to you. You must talk with your health care provider for complete information about your health and treatment  options. This information should not be used to decide whether or not to accept your health care providers advice, instructions or recommendations. Only your health care provider has the knowledge and training to provide advice that is right for you.  Copyright   Copyright © 2021 [employer], [employer]. and its affiliates and/or licensors. All rights reserved.

## 2022-01-14 NOTE — PROGRESS NOTES
Subjective:       Patient ID: Jarret Grant is a 36 y.o. male.    Vitals:  height is 6' (1.829 m) and weight is 79.4 kg (175 lb). His oral temperature is 98.1 °F (36.7 °C). His blood pressure is 135/83 and his pulse is 91. His respiration is 18 and oxygen saturation is 98%.     Chief Complaint: URI and Cough    URI   This is a new problem. The current episode started 1 to 4 weeks ago (x2-3 weeks). The problem has been unchanged. There has been no fever. Associated symptoms include congestion, coughing (chest irritation with coughing ) and a sore throat. Pertinent negatives include no abdominal pain, chest pain, diarrhea, ear pain, headaches, nausea, neck pain, sinus pain, vomiting or wheezing. He has tried decongestant for the symptoms. The treatment provided no relief.       Constitution: Negative for chills, sweating, fatigue, fever and generalized weakness.   HENT: Positive for congestion, postnasal drip and sore throat. Negative for ear pain, sinus pain, sinus pressure and voice change.    Neck: Negative for neck pain and neck stiffness.   Cardiovascular: Negative for chest pain, palpitations and sob on exertion.   Respiratory: Positive for cough (chest irritation with coughing ) and sputum production. Negative for chest tightness, shortness of breath and wheezing.    Gastrointestinal: Negative for abdominal pain, nausea, vomiting and diarrhea.   Musculoskeletal: Negative for muscle ache.   Neurological: Negative for dizziness, light-headedness and headaches.       Objective:      Physical Exam   Constitutional: He is oriented to person, place, and time. He appears well-developed and well-nourished. He is cooperative.  Non-toxic appearance. He does not have a sickly appearance. He does not appear ill. No distress.   HENT:   Head: Normocephalic and atraumatic.   Ears:   Right Ear: Hearing, tympanic membrane, external ear and ear canal normal.   Left Ear: Hearing, tympanic membrane, external ear and ear canal  normal.   Nose: Nose normal. No mucosal edema, rhinorrhea or nasal deformity. No epistaxis. Right sinus exhibits no maxillary sinus tenderness and no frontal sinus tenderness. Left sinus exhibits no maxillary sinus tenderness and no frontal sinus tenderness.   Mouth/Throat: Uvula is midline and mucous membranes are normal. Mucous membranes are moist. No trismus in the jaw. Normal dentition. No uvula swelling. Posterior oropharyngeal erythema present. No oropharyngeal exudate or posterior oropharyngeal edema. Tonsils are 2+ on the right. Tonsils are 2+ on the left. No tonsillar exudate. Oropharynx is clear.   Eyes: Conjunctivae and lids are normal. Pupils are equal, round, and reactive to light. No scleral icterus.      extraocular movement intact   Neck: Trachea normal and phonation normal. Neck supple. No edema present. No erythema present. No neck rigidity present.   Cardiovascular: Normal rate, regular rhythm, S1 normal, S2 normal, normal heart sounds, intact distal pulses and normal pulses. Exam reveals no decreased pulses.   Pulmonary/Chest: Effort normal and breath sounds normal. No accessory muscle usage. No respiratory distress. He has no decreased breath sounds. He has no wheezes. He has no rhonchi. He has no rales.   Abdominal: Normal appearance.   Musculoskeletal: Normal range of motion.         General: No deformity or edema. Normal range of motion.   Lymphadenopathy:     He has no cervical adenopathy.   Neurological: He is alert and oriented to person, place, and time. He exhibits normal muscle tone. Coordination normal.   Skin: Skin is warm, dry, intact, not diaphoretic and not pale.   Psychiatric: He has a normal mood and affect. His speech is normal and behavior is normal. Judgment and thought content normal. Cognition and memory  Nursing note and vitals reviewed.        Results for orders placed or performed in visit on 01/14/22   POCT COVID-19 Rapid Screening   Result Value Ref Range    POC Rapid  COVID Negative Negative     Acceptable Yes         Assessment:       1. Cough    2. Sore throat    3. Upper respiratory tract infection, unspecified type          Plan:         Reviewed negative COVID-19 virus test with patient who verbalized understanding (called him on his cell to let him know).  Advised patient that his symptoms are indicative of an upper respiratory infection which is viral in nature and should be treated symptomatically.  We discussed over-the-counter medications as well as home remedies to help with current symptoms.    Patient educational handouts also included in discharge paperwork for patient who verbalized understanding agrees with plan of care.  He denies any further questions or concerns at this time.  Patient exits exam room in no acute distress.  Cough  -     POCT COVID-19 Rapid Screening  -     benzonatate (TESSALON PERLES) 100 MG capsule; Take 1 capsule (100 mg total) by mouth 3 (three) times daily as needed for Cough.  Dispense: 30 capsule; Refill: 1    Sore throat  -     (Magic mouthwash) 1:1:1 diphenhydramine(Benadryl) 12.5mg/5ml liq, aluminum & magnesium hydroxide-simethicone (Maalox), LIDOcaine viscous 2%; Swish and spit 5 mLs every 4 (four) hours as needed (sore throat). For sore throat  Dispense: 90 mL; Refill: 0    Upper respiratory tract infection, unspecified type      Patient Instructions   You have been tested for the COVID-19; we will swab you and let you go home then call you should the results be positive, you will also be able to see your results in real-time on My Chart. There is no reason for you to extend your stay here as I can order antibodies  afterwards as well.Your test results for COVID-19  are pending.                                                              URI   If your condition worsens or fails to improve we recommend that you receive another evaluation at the ER immediately or contact your PCP to discuss your concerns or return here.  "You must understand that you've received an urgent care treatment only and that you may be released before all your medical problems are known or treated. You the patient will arrange for follouwp care as instructed.   If we discussed that I think your illness is viral, it will not respond to antibiotics and will last 5-7 days. If we discussed "wait and see" antibiotics and if over the next few days the symptoms worsen start the antibiotics I have given you.   -  If you are female and on BCP and do take the antibiotics, use additional methods to prevent pregnancy while on the antibiotics and for one cycle after.   -  Flonase (fluticasone) is a nasal spray which is available over the counter and may help with your symptoms.   -  Zyrtec D, Claritin D or Allegra D can also help with symptoms of congestion and drainage.   -  If you have hypertension avoid using the "D" which is the decongestant.  Instead you can use Coricidin HBP for cold and cough symptoms.    -  If you just have drainage you can take plain Zyrtec, Claritin or Allegra   -  If you just have a congested feeling you can take pseudoephedrine (unless you have high blood pressure) which you have to sign for behind the counter. Do not buy the phenylephrine which is on the shelf as it is not effective   -  Rest and fluids are also important.   -  Tylenol or ibuprofen can also be used as directed for pain unless you have an allergy to them or medical condition such as stomach ulcers, kidney or liver disease or blood thinners etc for which you should not be taking these type of medications.   -  If you are flying in the next few days Afrin nose drops for the airplane flight upon take off and landing may help. Other than at those times refrain from using afrin.   - If you were prescribed a narcotic do not drive or operate heavy machinery while taking these medications.       Patient Education       Cough Discharge Instructions, Adult   About this topic   Many " things can cause a cough. A cold or allergies can cause a cough. Other times, asthma or smoking can cause your cough. You can have a cough from mucus that drips down the back of your throat or from stomach acid that backs up into your throat. Sometimes a cough is a side effect from a drug. You may be waiting on some test results. If so, the staff will contact you if there are concerning results.  What care is needed at home?   · Ask your doctor what you need to do when you go home. Make sure you ask questions if you do not understand what the doctor says.  · To help you feel better:  ? Use a cool mist humidifier to avoid breathing dry air.  ? Use hard candy or cough drops to soothe sore throat and cough.  ? Gargle with salt water (mix 1/2 teaspoon salt with 1 cup warm water) a few times a day.  ? Spray saltwater mist in each nostril. Any normal saline spray works.  ? Sip warm liquids to keep your throat moist.  ? Take warm, steamy showers to help soothe the cough.  · Do not smoke or be in smoke-filled places. Avoid things that may cause breathing problems like vaping, fumes, pollution, dust, and other common allergens.  · You may want to use over-the-counter medicines for allergies or acid reflux if your cough is due to one of these problems.  · You can also use an over-the-counter cough medicine.  What follow-up care is needed?   Your doctor may ask you to make visits to the office to check on your progress. Be sure to keep these visits.  What drugs may be needed?   The doctor may order drugs to:  · Control coughing  · Get rid of or thin mucus  · Block allergens if your cough is due to allergies  · Increase airflow if your cough is due to asthma or COPD  · Reduce swelling of the airways  · Reduce stomach acid if your cough is due to stomach acid problems  Will physical activity be limited?   Your cough may interfere with some of your activities.  What changes to diet are needed?   Some people feel milk products  worsen their sputum production and worsen their cough. There is no proof that this is true. There is no need to reduce milk intake. Honey has been shown to be as effective as the leading over-the-counter (OTC) drug for calming coughs. Use 1/2 to 1 teaspoon (2.5 mL to 5 mL) of honey as needed. It can thin the secretions and loosen the cough. Never give honey to a child under the age of 1.  What can be done to prevent this health problem?   · Wash your hands often with soap and water for at least 20 seconds, especially after coughing or sneezing. Alcohol-based hand sanitizers also work to kill the virus.       · If you are sick, cover your mouth and nose with tissue when you cough or sneeze. You can also cough into your elbow. Throw away tissues in the trash and wash your hands after touching used tissues.  · Clean items and surfaces you most often touch like door handles, remotes, phones, or toys. Wipe them with a disinfectant. This can help reduce the spread of infection.  · Do not get too close (kissing, hugging) to people who are sick.  · Do not share towels or hankies with anyone who is sick.  · Stay away from crowded places.  · Get a flu shot each year.     When do I need to call the doctor?   · You have chest pain when you cough or trouble breathing.  · You start to cough up blood or yellow or green mucus.  · You have a fever of 100.4°F (38°C) or higher or chills.  · You cough so hard you throw up.  · You are still coughing in 10 days.  Teach Back: Helping You Understand   The Teach Back Method helps you understand the information we are giving you. After you talk with the staff, tell them in your own words what you learned. This helps to make sure the staff has described each thing clearly. It also helps to explain things that may have been confusing. Before going home, make sure you can do these:  · I can tell you about my condition.  · I can tell you what I can do to help thin the mucus and ease my  cough.  · I can tell you what I will do if I have wheezing, chest tightness, my lips turn blue with coughing, or I have other trouble breathing.  Where can I learn more?   American Academy of [employer] Physicians  https://familydoctor.org/cough-medicine-understanding-your-otc-options/   NHS Choices  https://www.nhs.uk/conditions/cough/   Last Reviewed Date   2021-06-10  Consumer Information Use and Disclaimer   This information is not specific medical advice and does not replace information you receive from your health care provider. This is only a brief summary of general information. It does NOT include all information about conditions, illnesses, injuries, tests, procedures, treatments, therapies, discharge instructions or life-style choices that may apply to you. You must talk with your health care provider for complete information about your health and treatment options. This information should not be used to decide whether or not to accept your health care providers advice, instructions or recommendations. Only your health care provider has the knowledge and training to provide advice that is right for you.  Copyright   Copyright © 2021 [employer] and its affiliates and/or licensors. All rights reserved.  Patient Education       Sore Throat Discharge Instructions, Adult   About this topic   Swelling at the back of your throat is called pharyngitis. Swelling of your throat and tonsils is tonsillopharyngitis. Both are commonly called a sore throat. Your sore throat is likely caused by a virus. Most of the time, a sore throat will go away without antibiotics in a week or two.           What care is needed at home?   · Ask your doctor what you need to do when you go home. Make sure you ask questions if you do not understand what the doctor says. This way you will know what you need to do.  · To help ease a sore throat you can:  ? Use a sore throat spray.  ? Suck on hard candy or throat lozenges.  ? Gargle with  warm saltwater a few times each day. Mix of 1/4 teaspoon (1.25 grams) salt in 8 ounces (240 mL) of warm water.  ? Use a cool mist humidifier to help you breathe easier.  · You may want to take medicine like acetaminophen, ibuprofen, or naproxen for pain.  · If you decide to take over-the-counter cough or cold medicines, follow the directions on the label carefully. Be sure you do not take more than one medicine that contains acetaminophen. Also, if you have a heart problem or high blood pressure, check with your doctor before you take any of these medicines.  · Wash your hands often. This will help keep others healthy.  What follow-up care is needed?   Your doctor may ask you to make visits to the office to check on your progress. Be sure to keep these visits.  What drugs may be needed?   Take your drugs as ordered by your doctor. Do not skip doses or stop when you feel better. The doctor may order drugs to:  · Prevent or fight an infection  · Help with pain  · Lower fever  · Help nasal congestion and runny nose  · Soothe the throat  Will physical activity be limited?   You may need to rest at home for 1 to 2 days or until you are feeling well.  What changes to diet are needed?   If your throat feels too sore to eat solid foods you may drink juice, milk, milkshakes, or soups. Talk to your doctor about what diet is proper for your condition.  What can be done to prevent this health problem?   · Wash your hands often. Be sure to wash after you blow your nose or take care of others with a sore throat.  · Do not share utensils and drinking glasses with someone who has a sore throat. Wash these objects with hot, soapy water.  · Do not share your foods or drinks with others while you are sick. You might infect them.  · Throw away used tissues right away and then wash your hands.  · Get a new toothbrush after signs are gone or you are done with your antibiotics.  When do I need to call the doctor?   · You have trouble  breathing.  · Your neck, tongue, or throat is swelling.  · You are drooling because you cannot swallow your saliva.  · You have very bad pain in your throat that keeps you from eating or drinking anything.  · There are large, painful lumps in your neck.  · You have blisters in the back of your throat.  Teach Back: Helping You Understand   The Teach Back Method helps you understand the information we are giving you. After you talk with the staff, tell them in your own words what you learned. This helps to make sure the staff has described each thing clearly. It also helps to explain things that may have been confusing. Before going home, make sure you can do these:  · I can tell you about my condition.  · I can tell you what may help ease my pain.  · I can tell you what I will do if I have trouble breathing or swallowing or have large painful lumps in my throat.  Where can I learn more?   Centers for Disease Control and Prevention  https://www.cdc.gov/antibiotic-use/community/for-patients/common-illnesses/sore-throat.html   Min[employer] of Health  https://www.health.govt.nz/your-health/conditions-and-treatments/diseases-and-illnesses/sore-throat   NHS Choices  https://www.nhs.uk/conditions/sore-throat/   Last Reviewed Date   2021-06-08  Consumer Information Use and Disclaimer   This information is not specific medical advice and does not replace information you receive from your health care provider. This is only a brief summary of general information. It does NOT include all information about conditions, illnesses, injuries, tests, procedures, treatments, therapies, discharge instructions or life-style choices that may apply to you. You must talk with your health care provider for complete information about your health and treatment options. This information should not be used to decide whether or not to accept your health care providers advice, instructions or recommendations. Only your health care provider has the  knowledge and training to provide advice that is right for you.  Copyright   Copyright © 2021 [employer] and its affiliates and/or licensors. All rights reserved.  Patient Education       Viral Upper Respiratory Infection Discharge Instructions, Adult   About this topic   You have an upper respiratory infection or URI. A URI can affect your nose, throat, ears, and sinuses. A virus is the cause of almost all URIs and antibiotics will not help you feel better more quickly. The common cold is an example of a viral URI.  URIs are easy to spread from person to person, most often through coughing or sneezing. A URI will almost always get better in a week or two without any treatment.         What care is needed at home?   · Ask your doctor what you need to do when you go home. Make sure you ask questions if you do not understand what the doctor says.  · If you smoke, try to quit. Your doctor or nurse can help.  · Drink lots of fluids like water, juice, or broth. This will help replace any fluids lost if you have a runny nose or fever. Warm tea or soup can help soothe a sore throat.  · If the air in your home feels dry, use a cool mist humidifier. This can help a stuffy nose and make it easier to breathe.  · You can also use saline nose drops to relieve stuffiness.  · If you decide to take over-the-counter cough or cold medicines, follow the directions on the label carefully. Be sure you do not take more than 1 medicine that contains acetaminophen. Also, if you have a heart problem or high blood pressure, check with your doctor before you take any of these medicines.  · Wash your hands often. Cough or sneeze into a tissue or your elbow instead of your hands. This will help keep others healthy.  What follow-up care is needed?   Your doctor may ask you to make visits to the office to check on your progress. Be sure to keep these visits.  What drugs may be needed?   The doctor may order drugs to:  · Open up the tubes of  your lungs  · Treat viral infection  · Relieve or stop coughing  · Help with pain from a sore throat  · Relieve runny and stuffy nose  · Provide oxygen  Will physical activity be limited?   You need to rest for a few days to let your body recover from the infection.  What changes to diet are needed?   Eat soft foods like soup if swallowing is too painful.  What problems could happen?   · Asthma attack  · Sinus infections  · Lung problems like pneumonia and bronchitis  · Severe fluid loss. This is dehydration.  What can be done to prevent this health problem?   · Wash your hands often with soap and water for at least 20 seconds, especially after coughing or sneezing. Alcohol-based hand sanitizers also work to kill the virus.  · If you are sick, cover your mouth and nose with tissue when you cough or sneeze. You can also cough into your elbow. Throw away tissues in the trash and wash your hands after touching used tissues.  · Do not get too close (kissing, hugging) to people who are sick.  · Do not share towels or hankies with anyone who is sick. Clean commonly handled things like door handles, remotes, toys, and phones. Wipe them with a disinfectant.  · Stay away from crowded places.  · Cover your nose and mouth when you sneeze or cough.  · Take vitamin C to help build up your body's ability to fight disease.  · Get a flu shot each year.  When do I need to call the doctor?   · You have trouble breathing when talking or sitting still.  · You have a fever of 100.4°F (38°C) or higher for several days, chills, a very bad sore throat, or ear or sinus pain.  · You develop a new fever after several days of feeling the same or improving.  · You develop chest pain when you cough.  · You have a cough that lasts more than 10 days.  · You cough up blood, or the color of the mucus you cough up changes.  Teach Back: Helping You Understand   The Teach Back Method helps you understand the information we are giving you. After you  talk with the staff, tell them in your own words what you learned. This helps to make sure the staff has described each thing clearly. It also helps to explain things that may have been confusing. Before going home, make sure you can do these:  · I can tell you about my condition.  · I can tell you what may help ease my signs.  · I can tell you what I will do if I have a fever, chills, breathing very fast, or trouble breathing.  Where can I learn more?   American Lung Association  https://www.lung.org/blog/can-you-exercise-with-a-cold   American Lung Association  https://www.lung.org/lung-health-diseases/lung-disease-lookup/influenza/facts-about-the-common-cold   NHS Choices  https://www.nhs.uk/conditions/respiratory-tract-infection/   [employer]Date  https://www.[employer]/contents/the-common-cold-in-adults-beyond-the-basics   Last Reviewed Date   2021-06-08  Consumer Information Use and Disclaimer   This information is not specific medical advice and does not replace information you receive from your health care provider. This is only a brief summary of general information. It does NOT include all information about conditions, illnesses, injuries, tests, procedures, treatments, therapies, discharge instructions or life-style choices that may apply to you. You must talk with your health care provider for complete information about your health and treatment options. This information should not be used to decide whether or not to accept your health care providers advice, instructions or recommendations. Only your health care provider has the knowledge and training to provide advice that is right for you.  Copyright   Copyright © 2021 [employer]Date, [employer]. and its affiliates and/or licensors. All rights reserved.

## 2022-01-25 ENCOUNTER — HOSPITAL ENCOUNTER (OUTPATIENT)
Dept: RADIOLOGY | Facility: HOSPITAL | Age: 37
Discharge: HOME | End: 2022-01-25
Attending: PHYSICIAN ASSISTANT
Payer: COMMERCIAL

## 2022-01-25 ENCOUNTER — OFFICE VISIT (OUTPATIENT)
Dept: INTERNAL MEDICINE | Facility: CLINIC | Age: 37
End: 2022-01-25
Payer: COMMERCIAL

## 2022-01-25 VITALS
BODY MASS INDEX: 23.83 KG/M2 | WEIGHT: 175.94 LBS | HEIGHT: 72 IN | SYSTOLIC BLOOD PRESSURE: 126 MMHG | OXYGEN SATURATION: 97 % | DIASTOLIC BLOOD PRESSURE: 72 MMHG | HEART RATE: 95 BPM

## 2022-01-25 DIAGNOSIS — R09.89 CHEST CONGESTION: ICD-10-CM

## 2022-01-25 DIAGNOSIS — R05.3 PERSISTENT COUGH: ICD-10-CM

## 2022-01-25 DIAGNOSIS — Z87.09 HISTORY OF SINUSITIS: ICD-10-CM

## 2022-01-25 DIAGNOSIS — R05.3 PERSISTENT COUGH: Primary | ICD-10-CM

## 2022-01-25 PROCEDURE — 3078F PR MOST RECENT DIASTOLIC BLOOD PRESSURE < 80 MM HG: ICD-10-PCS | Mod: CPTII,S$GLB,, | Performed by: PHYSICIAN ASSISTANT

## 2022-01-25 PROCEDURE — 3074F SYST BP LT 130 MM HG: CPT | Mod: CPTII,S$GLB,, | Performed by: PHYSICIAN ASSISTANT

## 2022-01-25 PROCEDURE — 71046 X-RAY EXAM CHEST 2 VIEWS: CPT | Mod: TC

## 2022-01-25 PROCEDURE — 99213 PR OFFICE/OUTPT VISIT, EST, LEVL III, 20-29 MIN: ICD-10-PCS | Mod: S$GLB,,, | Performed by: PHYSICIAN ASSISTANT

## 2022-01-25 PROCEDURE — 3008F PR BODY MASS INDEX (BMI) DOCUMENTED: ICD-10-PCS | Mod: CPTII,S$GLB,, | Performed by: PHYSICIAN ASSISTANT

## 2022-01-25 PROCEDURE — 1159F MED LIST DOCD IN RCRD: CPT | Mod: CPTII,S$GLB,, | Performed by: PHYSICIAN ASSISTANT

## 2022-01-25 PROCEDURE — 71046 XR CHEST PA AND LATERAL: ICD-10-PCS | Mod: 26,,, | Performed by: RADIOLOGY

## 2022-01-25 PROCEDURE — 99999 PR PBB SHADOW E&M-EST. PATIENT-LVL III: ICD-10-PCS | Mod: PBBFAC,,, | Performed by: PHYSICIAN ASSISTANT

## 2022-01-25 PROCEDURE — 99999 PR PBB SHADOW E&M-EST. PATIENT-LVL III: CPT | Mod: PBBFAC,,, | Performed by: PHYSICIAN ASSISTANT

## 2022-01-25 PROCEDURE — 99213 OFFICE O/P EST LOW 20 MIN: CPT | Mod: S$GLB,,, | Performed by: PHYSICIAN ASSISTANT

## 2022-01-25 PROCEDURE — 71046 X-RAY EXAM CHEST 2 VIEWS: CPT | Mod: 26,,, | Performed by: RADIOLOGY

## 2022-01-25 PROCEDURE — 3074F PR MOST RECENT SYSTOLIC BLOOD PRESSURE < 130 MM HG: ICD-10-PCS | Mod: CPTII,S$GLB,, | Performed by: PHYSICIAN ASSISTANT

## 2022-01-25 PROCEDURE — 1159F PR MEDICATION LIST DOCUMENTED IN MEDICAL RECORD: ICD-10-PCS | Mod: CPTII,S$GLB,, | Performed by: PHYSICIAN ASSISTANT

## 2022-01-25 PROCEDURE — 3008F BODY MASS INDEX DOCD: CPT | Mod: CPTII,S$GLB,, | Performed by: PHYSICIAN ASSISTANT

## 2022-01-25 PROCEDURE — 3078F DIAST BP <80 MM HG: CPT | Mod: CPTII,S$GLB,, | Performed by: PHYSICIAN ASSISTANT

## 2022-01-25 RX ORDER — LEVOCETIRIZINE DIHYDROCHLORIDE 5 MG/1
5 TABLET, FILM COATED ORAL NIGHTLY
Qty: 30 TABLET | Refills: 1 | Status: SHIPPED | OUTPATIENT
Start: 2022-01-25 | End: 2023-01-25

## 2022-01-25 RX ORDER — PROMETHAZINE HYDROCHLORIDE AND DEXTROMETHORPHAN HYDROBROMIDE 6.25; 15 MG/5ML; MG/5ML
5 SYRUP ORAL NIGHTLY PRN
Qty: 118 ML | Refills: 0 | Status: SHIPPED | OUTPATIENT
Start: 2022-01-25 | End: 2022-02-04

## 2022-01-25 NOTE — PROGRESS NOTES
Subjective:       Patient ID: Jarret Grant is a 36 y.o. male.    Chief Complaint: Cough    HPI     Established pt of Primary Doctor No (new to me)    Here with concerns of persistent cough and congestion for the past 3 to 4 weeks. Seen in UC about 10 days ago for symptoms. Rapid COVID was negative. He was prescribed tessalon and magic mouth wash. He notes sore throat has improved but still with a lot of chest congestion, cough (productive) and runny nose. He has tried Mucinex-D and Flonase. tessalon was ineffective.     He has COVID about 2 months prior to his current symptoms.     He is concerned about his lungs and long term COVID effects.      Notes hx of sinus/allergies, no facial pain or sinus congestion currently.       Past Medical History:   Diagnosis Date    Allergy      Social History     Tobacco Use    Smoking status: Never Smoker    Smokeless tobacco: Never Used   Substance Use Topics    Alcohol use: Yes     Review of patient's allergies indicates:  No Known Allergies      Review of Systems   Constitutional: Negative for chills, fever and unexpected weight change.   HENT: Positive for rhinorrhea. Negative for ear pain and sinus pressure/congestion. Sore throat: resolved.    Eyes: Negative for visual disturbance.   Respiratory: Positive for cough and shortness of breath (with coughing spells. ). Negative for chest tightness.         Chest congestion   Cardiovascular: Negative for chest pain and leg swelling.   Gastrointestinal: Negative for abdominal pain, nausea and vomiting.   Integumentary:  Negative for rash.   Neurological: Negative for weakness and headaches.         Objective: /72 (BP Location: Right arm, Patient Position: Sitting, BP Method: Medium (Manual))   Pulse 95   Ht 6' (1.829 m)   Wt 79.8 kg (175 lb 14.8 oz)   SpO2 97%   BMI 23.86 kg/m²         Physical Exam  Vitals reviewed.   Constitutional:       General: He is not in acute distress.     Appearance: He is well-developed.  He is not ill-appearing.   HENT:      Head: Normocephalic and atraumatic.      Right Ear: Tympanic membrane, ear canal and external ear normal.      Left Ear: Tympanic membrane, ear canal and external ear normal.      Mouth/Throat:      Pharynx: Posterior oropharyngeal erythema (mild) present. No oropharyngeal exudate.   Eyes:      Extraocular Movements: Extraocular movements intact.      Conjunctiva/sclera: Conjunctivae normal.   Cardiovascular:      Rate and Rhythm: Normal rate and regular rhythm.      Heart sounds: No murmur heard.      Pulmonary:      Effort: Pulmonary effort is normal.      Breath sounds: Normal breath sounds. No wheezing or rales.   Abdominal:      General: Bowel sounds are normal.      Palpations: Abdomen is soft.      Tenderness: There is no abdominal tenderness.   Lymphadenopathy:      Cervical: No cervical adenopathy.   Skin:     General: Skin is warm and dry.      Findings: No rash.   Neurological:      Mental Status: He is alert.         Assessment:       Problem List Items Addressed This Visit    None     Visit Diagnoses     Persistent cough    -  Primary    Chest congestion              Plan:         Jarret was seen today for cough.    Diagnoses and all orders for this visit:    Persistent cough  -     X-Ray Chest PA And Lateral; Future  -     levocetirizine (XYZAL) 5 MG tablet; Take 1 tablet (5 mg total) by mouth every evening.  -     promethazine-dextromethorphan (PROMETHAZINE-DM) 6.25-15 mg/5 mL Syrp; Take 5 mLs by mouth nightly as needed.    Chest congestion  -     levocetirizine (XYZAL) 5 MG tablet; Take 1 tablet (5 mg total) by mouth every evening.  -     promethazine-dextromethorphan (PROMETHAZINE-DM) 6.25-15 mg/5 mL Syrp; Take 5 mLs by mouth nightly as needed.    History of sinusitis      Recommend antihistamine  Continue Mucinex and flonase  Trial of Promethazine-DM  Follow up CXR  RTC prn    Bhumika Mortensen PA-C

## 2022-12-11 ENCOUNTER — OFFICE VISIT (OUTPATIENT)
Dept: URGENT CARE | Facility: CLINIC | Age: 37
End: 2022-12-11
Payer: COMMERCIAL

## 2022-12-11 VITALS
DIASTOLIC BLOOD PRESSURE: 91 MMHG | HEIGHT: 72 IN | WEIGHT: 175.94 LBS | RESPIRATION RATE: 16 BRPM | SYSTOLIC BLOOD PRESSURE: 135 MMHG | TEMPERATURE: 99 F | HEART RATE: 102 BPM | OXYGEN SATURATION: 98 % | BODY MASS INDEX: 23.83 KG/M2

## 2022-12-11 DIAGNOSIS — J02.9 SORE THROAT: ICD-10-CM

## 2022-12-11 DIAGNOSIS — J02.0 STREP PHARYNGITIS: Primary | ICD-10-CM

## 2022-12-11 LAB
CTP QC/QA: YES
MOLECULAR STREP A: POSITIVE
POC MOLECULAR INFLUENZA A AGN: NEGATIVE
POC MOLECULAR INFLUENZA B AGN: NEGATIVE
SARS-COV-2 AG RESP QL IA.RAPID: NEGATIVE

## 2022-12-11 PROCEDURE — 87502 POCT INFLUENZA A/B MOLECULAR: ICD-10-PCS | Mod: QW,S$GLB,, | Performed by: NURSE PRACTITIONER

## 2022-12-11 PROCEDURE — 87651 POCT STREP A MOLECULAR: ICD-10-PCS | Mod: QW,S$GLB,, | Performed by: NURSE PRACTITIONER

## 2022-12-11 PROCEDURE — 3075F SYST BP GE 130 - 139MM HG: CPT | Mod: CPTII,S$GLB,, | Performed by: NURSE PRACTITIONER

## 2022-12-11 PROCEDURE — 1160F RVW MEDS BY RX/DR IN RCRD: CPT | Mod: CPTII,S$GLB,, | Performed by: NURSE PRACTITIONER

## 2022-12-11 PROCEDURE — 99213 PR OFFICE/OUTPT VISIT, EST, LEVL III, 20-29 MIN: ICD-10-PCS | Mod: S$GLB,,, | Performed by: NURSE PRACTITIONER

## 2022-12-11 PROCEDURE — 87502 INFLUENZA DNA AMP PROBE: CPT | Mod: QW,S$GLB,, | Performed by: NURSE PRACTITIONER

## 2022-12-11 PROCEDURE — 1159F PR MEDICATION LIST DOCUMENTED IN MEDICAL RECORD: ICD-10-PCS | Mod: CPTII,S$GLB,, | Performed by: NURSE PRACTITIONER

## 2022-12-11 PROCEDURE — 3008F PR BODY MASS INDEX (BMI) DOCUMENTED: ICD-10-PCS | Mod: CPTII,S$GLB,, | Performed by: NURSE PRACTITIONER

## 2022-12-11 PROCEDURE — 3080F PR MOST RECENT DIASTOLIC BLOOD PRESSURE >= 90 MM HG: ICD-10-PCS | Mod: CPTII,S$GLB,, | Performed by: NURSE PRACTITIONER

## 2022-12-11 PROCEDURE — 87811 SARS CORONAVIRUS 2 ANTIGEN POCT, MANUAL READ: ICD-10-PCS | Mod: QW,S$GLB,, | Performed by: NURSE PRACTITIONER

## 2022-12-11 PROCEDURE — 3008F BODY MASS INDEX DOCD: CPT | Mod: CPTII,S$GLB,, | Performed by: NURSE PRACTITIONER

## 2022-12-11 PROCEDURE — 87811 SARS-COV-2 COVID19 W/OPTIC: CPT | Mod: QW,S$GLB,, | Performed by: NURSE PRACTITIONER

## 2022-12-11 PROCEDURE — 1160F PR REVIEW ALL MEDS BY PRESCRIBER/CLIN PHARMACIST DOCUMENTED: ICD-10-PCS | Mod: CPTII,S$GLB,, | Performed by: NURSE PRACTITIONER

## 2022-12-11 PROCEDURE — 99213 OFFICE O/P EST LOW 20 MIN: CPT | Mod: S$GLB,,, | Performed by: NURSE PRACTITIONER

## 2022-12-11 PROCEDURE — 87651 STREP A DNA AMP PROBE: CPT | Mod: QW,S$GLB,, | Performed by: NURSE PRACTITIONER

## 2022-12-11 PROCEDURE — 3075F PR MOST RECENT SYSTOLIC BLOOD PRESS GE 130-139MM HG: ICD-10-PCS | Mod: CPTII,S$GLB,, | Performed by: NURSE PRACTITIONER

## 2022-12-11 PROCEDURE — 3080F DIAST BP >= 90 MM HG: CPT | Mod: CPTII,S$GLB,, | Performed by: NURSE PRACTITIONER

## 2022-12-11 PROCEDURE — 1159F MED LIST DOCD IN RCRD: CPT | Mod: CPTII,S$GLB,, | Performed by: NURSE PRACTITIONER

## 2022-12-11 RX ORDER — AMOXICILLIN 500 MG/1
500 TABLET, FILM COATED ORAL EVERY 12 HOURS
Qty: 20 TABLET | Refills: 0 | Status: SHIPPED | OUTPATIENT
Start: 2022-12-11 | End: 2022-12-21

## 2022-12-11 NOTE — PATIENT INSTRUCTIONS
See additional patient Instructions provided    - Rest.    - Drink plenty of fluids.  - Viral upper respiratory infections typically run their course in 10-14 days.     - Tylenol or Ibuprofen as directed as needed for fever/pain. Avoid tylenol if you have a history of liver disease. Do not take ibuprofen if you have a history of GI bleeding, kidney disease, or if you take blood thinners.     - You can take over-the-counter claritin, zyrtec, allegra, or xyzal as directed. These are antihistamines that can help with runny nose, nasal congestion, sneezing, and helps to dry up post-nasal drip, which usually causes sore throat and cough.   - If you do NOT have high blood pressure, you may use a decongestant form (D)  of this medication (ie. Claritin- D, zyrtec-D, allegra-D) or if you do not take the D form, you can take sudafed (pseudoephedrine) over the counter, which is a decongestant. Do NOT take two decongestant (D) medications at the same time (such as mucinex-D and claritin-D or plain sudafed and claritin D)    - You can use Flonase (fluticasone) nasal spray as directed for sinus congestion and postnasal drip. This is a steroid nasal spray that works locally over time to decrease the inflammation in your nose/sinuses and help with allergic symptoms. This is not an quick- relief spray like afrin, but it works well if used daily.  Discontinue if you develop nose bleed  - use nasal saline prior to Flonase.  - Use Ocean Spray Nasal Saline 1-3 puffs each nostril every 2-3 hours then blow out onto tissue. This is to irrigate the nasal passage way to clear the sinus openings. Use until sinus problem resolved.    - you can take plain Mucinex (guaifenesin) 1200 mg twice a day to help loosen mucous.     -warm salt water gargles can help with sore throat    - warm tea with honey can help with cough. Honey is a natural cough suppressant.    - Dextromethorphan (DM) is a cough suppressant over the counter (ie. mucinex DM,  robitussin, delsym; dayquil/nyquil has DM as well.)    - Follow up with your PCP or specialty clinic as directed in the next 1-2 weeks if not improved or as needed.  You can call [phone] to schedule an appointment with the appropriate provider.      - Go to the ER if you develop new or worsening symptoms.     - You must understand that you have received an Urgent Care treatment only and that you may be released before all of your medical problems are known or treated.   - You, the patient, will arrange for follow up care as instructed.   - If your condition worsens or fails to improve we recommend that you receive another evaluation at the ER immediately or contact your PCP to discuss your concerns or return here.     Patient Instructions   - You must understand that you have received an Urgent Care treatment only and that you may be released before all of your medical problems are known or treated.   - You, the patient, will arrange for follow up care as instructed.   - If your condition worsens or fails to improve we recommend that you receive another evaluation at the ER immediately or contact your PCP to discuss your concerns or return here.     Advised on return/follow-up precautions. Advised on ER precautions. Answered all patient questions. Patient verbalized understanding and voiced agreement with current treatment plan.

## 2022-12-11 NOTE — PROGRESS NOTES
Subjective:       Patient ID: Jarret Grant is a 37 y.o. male.    Vitals:  height is 6' (1.829 m) and weight is 79.8 kg (175 lb 14.8 oz). His oral temperature is 98.5 °F (36.9 °C). His blood pressure is 135/91 (abnormal) and his pulse is 102. His respiration is 16 and oxygen saturation is 98%.     Chief Complaint: URI      Patient has been experiencing a sore throat and congestion starting a week and a half ago. Patient tried taking cough drops and had only slight relief.       URI   This is a new problem. The current episode started in the past 7 days. The problem has been unchanged. There has been no fever. Associated symptoms include congestion, headaches and a sore throat. Pertinent negatives include no abdominal pain, chest pain, coughing, diarrhea, ear pain, nausea, neck pain, rash, sinus pain, sneezing, vomiting or wheezing. Associated symptoms comments: Post nasal drip. He has tried decongestant (mucinex d) for the symptoms. The treatment provided mild relief.     Constitution: Negative for chills, sweating, fatigue and fever.   HENT:  Positive for congestion and sore throat. Negative for ear pain, ear discharge, postnasal drip, sinus pain, sinus pressure, trouble swallowing and voice change.    Neck: Negative for neck pain and painful lymph nodes.   Cardiovascular:  Negative for chest pain, palpitations and sob on exertion.   Respiratory:  Negative for chest tightness, cough, sputum production, shortness of breath and wheezing.    Gastrointestinal:  Negative for abdominal pain, nausea, vomiting and diarrhea.   Musculoskeletal:  Negative for muscle ache.   Skin:  Negative for color change, pale and rash.   Allergic/Immunologic: Negative for sneezing.   Neurological:  Positive for headaches.   Hematologic/Lymphatic: Negative for swollen lymph nodes.     Objective:      Physical Exam   Constitutional: He is oriented to person, place, and time. He appears well-developed. He is cooperative.  Non-toxic  appearance. He does not appear ill. No distress.   HENT:   Head: Normocephalic and atraumatic.   Ears:   Right Ear: Hearing, tympanic membrane, external ear and ear canal normal.   Left Ear: Hearing, tympanic membrane, external ear and ear canal normal.   Nose: Nose normal. No mucosal edema, rhinorrhea or nasal deformity. No epistaxis. Right sinus exhibits no maxillary sinus tenderness and no frontal sinus tenderness. Left sinus exhibits no maxillary sinus tenderness and no frontal sinus tenderness.   Mouth/Throat: Uvula is midline and mucous membranes are normal. No trismus in the jaw. Normal dentition. No uvula swelling. Posterior oropharyngeal erythema present. No oropharyngeal exudate or posterior oropharyngeal edema.   Eyes: Conjunctivae and lids are normal. No scleral icterus.   Neck: Trachea normal and phonation normal. Neck supple. No edema present. No erythema present. No neck rigidity present.   Cardiovascular: Normal rate.   Pulmonary/Chest: Effort normal. No respiratory distress. He has no decreased breath sounds.   Abdominal: Normal appearance.   Musculoskeletal: Normal range of motion.         General: No deformity. Normal range of motion.      Cervical back: He exhibits no tenderness.   Lymphadenopathy:     He has cervical adenopathy.   Neurological: He is alert and oriented to person, place, and time. He exhibits normal muscle tone. Coordination normal.   Skin: Skin is warm, dry, intact, not diaphoretic and not pale.   Psychiatric: His speech is normal and behavior is normal. Judgment and thought content normal.   Nursing note and vitals reviewed.      Results for orders placed or performed in visit on 12/11/22   SARS Coronavirus 2 Antigen, POCT Manual Read   Result Value Ref Range    SARS Coronavirus 2 Antigen Negative Negative     Acceptable Yes    POCT Influenza A/B MOLECULAR   Result Value Ref Range    POC Molecular Influenza A Ag Negative Negative, Not Reported    POC Molecular  Influenza B Ag Negative Negative, Not Reported     Acceptable Yes    POCT Strep A, Molecular   Result Value Ref Range    Molecular Strep A, POC Positive (A) Negative     Acceptable Yes        Assessment:       1. Strep pharyngitis    2. Sore throat          Plan:         Strep pharyngitis  -     amoxicillin (AMOXIL) 500 MG Tab; Take 1 tablet (500 mg total) by mouth every 12 (twelve) hours. for 10 days  Dispense: 20 tablet; Refill: 0    Sore throat  -     SARS Coronavirus 2 Antigen, POCT Manual Read  -     POCT Influenza A/B MOLECULAR  -     POCT Strep A, Molecular                   Patient Instructions   See additional patient Instructions provided    - Rest.    - Drink plenty of fluids.  - Viral upper respiratory infections typically run their course in 10-14 days.     - Tylenol or Ibuprofen as directed as needed for fever/pain. Avoid tylenol if you have a history of liver disease. Do not take ibuprofen if you have a history of GI bleeding, kidney disease, or if you take blood thinners.     - You can take over-the-counter claritin, zyrtec, allegra, or xyzal as directed. These are antihistamines that can help with runny nose, nasal congestion, sneezing, and helps to dry up post-nasal drip, which usually causes sore throat and cough.   - If you do NOT have high blood pressure, you may use a decongestant form (D)  of this medication (ie. Claritin- D, zyrtec-D, allegra-D) or if you do not take the D form, you can take sudafed (pseudoephedrine) over the counter, which is a decongestant. Do NOT take two decongestant (D) medications at the same time (such as mucinex-D and claritin-D or plain sudafed and claritin D)    - You can use Flonase (fluticasone) nasal spray as directed for sinus congestion and postnasal drip. This is a steroid nasal spray that works locally over time to decrease the inflammation in your nose/sinuses and help with allergic symptoms. This is not an quick- relief spray  like afrin, but it works well if used daily.  Discontinue if you develop nose bleed  - use nasal saline prior to Flonase.  - Use Ocean Spray Nasal Saline 1-3 puffs each nostril every 2-3 hours then blow out onto tissue. This is to irrigate the nasal passage way to clear the sinus openings. Use until sinus problem resolved.    - you can take plain Mucinex (guaifenesin) 1200 mg twice a day to help loosen mucous.     -warm salt water gargles can help with sore throat    - warm tea with honey can help with cough. Honey is a natural cough suppressant.    - Dextromethorphan (DM) is a cough suppressant over the counter (ie. mucinex DM, robitussin, delsym; dayquil/nyquil has DM as well.)    - Follow up with your PCP or specialty clinic as directed in the next 1-2 weeks if not improved or as needed.  You can call [phone] to schedule an appointment with the appropriate provider.      - Go to the ER if you develop new or worsening symptoms.     - You must understand that you have received an Urgent Care treatment only and that you may be released before all of your medical problems are known or treated.   - You, the patient, will arrange for follow up care as instructed.   - If your condition worsens or fails to improve we recommend that you receive another evaluation at the ER immediately or contact your PCP to discuss your concerns or return here.     Patient Instructions   - You must understand that you have received an Urgent Care treatment only and that you may be released before all of your medical problems are known or treated.   - You, the patient, will arrange for follow up care as instructed.   - If your condition worsens or fails to improve we recommend that you receive another evaluation at the ER immediately or contact your PCP to discuss your concerns or return here.     Advised on return/follow-up precautions. Advised on ER precautions. Answered all patient questions. Patient verbalized understanding and  voiced agreement with current treatment plan.

## 2023-02-24 ENCOUNTER — OFFICE VISIT (OUTPATIENT)
Dept: URGENT CARE | Facility: CLINIC | Age: 38
End: 2023-02-24
Payer: COMMERCIAL

## 2023-02-24 VITALS
OXYGEN SATURATION: 97 % | DIASTOLIC BLOOD PRESSURE: 77 MMHG | RESPIRATION RATE: 18 BRPM | TEMPERATURE: 98 F | HEIGHT: 72 IN | WEIGHT: 170 LBS | SYSTOLIC BLOOD PRESSURE: 125 MMHG | BODY MASS INDEX: 23.03 KG/M2 | HEART RATE: 67 BPM

## 2023-02-24 DIAGNOSIS — J32.9 BACTERIAL SINUSITIS: Primary | ICD-10-CM

## 2023-02-24 DIAGNOSIS — B96.89 BACTERIAL SINUSITIS: Primary | ICD-10-CM

## 2023-02-24 PROCEDURE — 3074F PR MOST RECENT SYSTOLIC BLOOD PRESSURE < 130 MM HG: ICD-10-PCS | Mod: CPTII,S$GLB,, | Performed by: INTERNAL MEDICINE

## 2023-02-24 PROCEDURE — 99214 PR OFFICE/OUTPT VISIT, EST, LEVL IV, 30-39 MIN: ICD-10-PCS | Mod: S$GLB,,, | Performed by: INTERNAL MEDICINE

## 2023-02-24 PROCEDURE — 3008F PR BODY MASS INDEX (BMI) DOCUMENTED: ICD-10-PCS | Mod: CPTII,S$GLB,, | Performed by: INTERNAL MEDICINE

## 2023-02-24 PROCEDURE — 99214 OFFICE O/P EST MOD 30 MIN: CPT | Mod: S$GLB,,, | Performed by: INTERNAL MEDICINE

## 2023-02-24 PROCEDURE — 3078F DIAST BP <80 MM HG: CPT | Mod: CPTII,S$GLB,, | Performed by: INTERNAL MEDICINE

## 2023-02-24 PROCEDURE — 3078F PR MOST RECENT DIASTOLIC BLOOD PRESSURE < 80 MM HG: ICD-10-PCS | Mod: CPTII,S$GLB,, | Performed by: INTERNAL MEDICINE

## 2023-02-24 PROCEDURE — 1159F PR MEDICATION LIST DOCUMENTED IN MEDICAL RECORD: ICD-10-PCS | Mod: CPTII,S$GLB,, | Performed by: INTERNAL MEDICINE

## 2023-02-24 PROCEDURE — 1159F MED LIST DOCD IN RCRD: CPT | Mod: CPTII,S$GLB,, | Performed by: INTERNAL MEDICINE

## 2023-02-24 PROCEDURE — 3008F BODY MASS INDEX DOCD: CPT | Mod: CPTII,S$GLB,, | Performed by: INTERNAL MEDICINE

## 2023-02-24 PROCEDURE — 3074F SYST BP LT 130 MM HG: CPT | Mod: CPTII,S$GLB,, | Performed by: INTERNAL MEDICINE

## 2023-02-24 RX ORDER — AMOXICILLIN AND CLAVULANATE POTASSIUM 875; 125 MG/1; MG/1
1 TABLET, FILM COATED ORAL EVERY 12 HOURS
Qty: 14 TABLET | Refills: 0 | Status: SHIPPED | OUTPATIENT
Start: 2023-02-24 | End: 2023-03-03

## 2023-02-24 RX ORDER — PREDNISONE 20 MG/1
40 TABLET ORAL DAILY
Qty: 6 TABLET | Refills: 0 | Status: SHIPPED | OUTPATIENT
Start: 2023-02-24 | End: 2023-02-27

## 2023-02-24 NOTE — PROGRESS NOTES
Subjective:       Patient ID: Jarret Grant is a 37 y.o. male.    Vitals:  height is 6' (1.829 m) and weight is 77.1 kg (170 lb). His temperature is 98 °F (36.7 °C). His blood pressure is 125/77 and his pulse is 67. His respiration is 18 and oxygen saturation is 97%.     Chief Complaint: Sinus Problem    This is a 37 y.o. male who presents today with a chief complaint of sinus pressure, headaches, nasal congestion, post nasal drip x 3 weeks. Treated with Mucinex, nasal rinses, and zyrtec.       Sinus Problem  This is a new problem. The current episode started 1 to 4 weeks ago. The problem is unchanged. There has been no fever. His pain is at a severity of 4/10. The pain is mild. Associated symptoms include congestion, headaches and sinus pressure. Pertinent negatives include no coughing. Treatments tried: Mucinex, nasal rinses, and zyrtec. The treatment provided no relief.     HENT:  Positive for congestion, postnasal drip and sinus pressure.    Respiratory:  Negative for cough.    Skin:  Negative for erythema.   Neurological:  Positive for headaches.     Objective:      Physical Exam   Constitutional: He is oriented to person, place, and time. He appears well-developed.  Non-toxic appearance. He does not appear ill. No distress.   HENT:   Head: Normocephalic and atraumatic.   Ears:   Right Ear: External ear normal.   Left Ear: External ear normal.   Nose: Congestion present.   Mouth/Throat: Oropharynx is clear and moist. No oropharyngeal exudate.   Eyes: Conjunctivae and EOM are normal. Pupils are equal, round, and reactive to light. Right eye exhibits no discharge. Left eye exhibits no discharge. No scleral icterus.   Neck: Neck supple.   Cardiovascular: Normal rate, regular rhythm and normal heart sounds.   No murmur heard.Exam reveals no gallop and no friction rub.   Pulmonary/Chest: Effort normal. No respiratory distress. He has no wheezes. He has no rales.   Abdominal: Bowel sounds are normal. He exhibits no  distension. Soft.   Lymphadenopathy:     He has no cervical adenopathy.   Neurological: He is alert and oriented to person, place, and time.   Skin: Skin is warm, dry, not diaphoretic and no rash. Capillary refill takes less than 2 seconds. No erythema   Psychiatric: His behavior is normal.   Nursing note and vitals reviewed.      Assessment:       1. Bacterial sinusitis          Plan:         Bacterial sinusitis  -     amoxicillin-clavulanate 875-125mg (AUGMENTIN) 875-125 mg per tablet; Take 1 tablet by mouth every 12 (twelve) hours. for 7 days  Dispense: 14 tablet; Refill: 0  -     predniSONE (DELTASONE) 20 MG tablet; Take 2 tablets (40 mg total) by mouth once daily. for 3 days  Dispense: 6 tablet; Refill: 0

## 2024-02-25 ENCOUNTER — OFFICE VISIT (OUTPATIENT)
Dept: URGENT CARE | Facility: CLINIC | Age: 39
End: 2024-02-25
Payer: COMMERCIAL

## 2024-02-25 VITALS
WEIGHT: 170 LBS | HEIGHT: 72 IN | DIASTOLIC BLOOD PRESSURE: 74 MMHG | SYSTOLIC BLOOD PRESSURE: 118 MMHG | HEART RATE: 79 BPM | OXYGEN SATURATION: 98 % | RESPIRATION RATE: 18 BRPM | TEMPERATURE: 98 F | BODY MASS INDEX: 23.03 KG/M2

## 2024-02-25 DIAGNOSIS — U07.1 COVID-19 VIRUS DETECTED: Primary | ICD-10-CM

## 2024-02-25 DIAGNOSIS — R05.9 COUGH, UNSPECIFIED TYPE: ICD-10-CM

## 2024-02-25 DIAGNOSIS — J32.9 SINUSITIS, UNSPECIFIED CHRONICITY, UNSPECIFIED LOCATION: ICD-10-CM

## 2024-02-25 LAB
CTP QC/QA: YES
SARS-COV-2 AG RESP QL IA.RAPID: POSITIVE

## 2024-02-25 PROCEDURE — 99214 OFFICE O/P EST MOD 30 MIN: CPT | Mod: S$GLB,,, | Performed by: FAMILY MEDICINE

## 2024-02-25 PROCEDURE — 87811 SARS-COV-2 COVID19 W/OPTIC: CPT | Mod: QW,S$GLB,, | Performed by: FAMILY MEDICINE

## 2024-02-25 RX ORDER — AMOXICILLIN AND CLAVULANATE POTASSIUM 875; 125 MG/1; MG/1
1 TABLET, FILM COATED ORAL 2 TIMES DAILY
Qty: 14 TABLET | Refills: 0 | Status: SHIPPED | OUTPATIENT
Start: 2024-02-25 | End: 2024-03-03

## 2024-02-25 NOTE — PROGRESS NOTES
Subjective:      Patient ID: Jarret Grant is a 38 y.o. male.    Vitals:  height is 6' (1.829 m) and weight is 77.1 kg (170 lb). His oral temperature is 97.9 °F (36.6 °C). His blood pressure is 118/74 and his pulse is 79. His respiration is 18 and oxygen saturation is 98%.     Chief Complaint: Sinus Problem    38-year-old male with history of allergic rhinitis, which he stays on top of with Xyzal.  Past 6 days however with progressive sinus pressure and now consistently yellow nasal drainage, consistent with his previous issues of sinusitis.  No known exposures to COVID or flu that he is aware.  He does have 2 small children.  No chest pain or shortness a breath.  No fever at home    Sinus Problem  This is a new problem. The current episode started in the past 7 days. There has been no fever. Associated symptoms include congestion, coughing, headaches, a hoarse voice, sinus pressure, sneezing and a sore throat. (fatigue) Treatments tried: mucinex, nasal spray. The treatment provided mild relief.       HENT:  Positive for congestion, sinus pressure and sore throat.    Respiratory:  Positive for cough.    Allergic/Immunologic: Positive for sneezing.   Neurological:  Positive for headaches.      Objective:     Physical Exam   Constitutional: He is oriented to person, place, and time. He appears well-developed.  Non-toxic appearance. He does not appear ill. No distress.   HENT:   Head: Normocephalic and atraumatic.   Ears:   Right Ear: Tympanic membrane and external ear normal.   Left Ear: Tympanic membrane and external ear normal.   Mouth/Throat: Oropharynx is clear and moist. No oropharyngeal exudate or posterior oropharyngeal erythema.      Comments: Left maxillary sinus tenderness noted.  Eyes: Conjunctivae and EOM are normal. Pupils are equal, round, and reactive to light.   Cardiovascular: Normal rate, regular rhythm and normal heart sounds.   Pulmonary/Chest: Effort normal and breath sounds normal. No  stridor. No respiratory distress. He has no wheezes. He has no rhonchi. He has no rales.   Lymphadenopathy:     He has no cervical adenopathy.   Neurological: He is alert and oriented to person, place, and time.   Skin: Skin is warm, dry and not diaphoretic.   Psychiatric: His behavior is normal. Judgment and thought content normal.   Vitals reviewed.    Results for orders placed or performed in visit on 02/25/24   SARS Coronavirus 2 Antigen, POCT Manual Read   Result Value Ref Range    SARS Coronavirus 2 Antigen Positive (A) Negative     Acceptable Yes       Assessment:     1. COVID-19 virus detected    2. Cough, unspecified type    3. Sinusitis, unspecified chronicity, unspecified location        Plan:       COVID-19 virus detected    Cough, unspecified type  -     SARS Coronavirus 2 Antigen, POCT Manual Read    Sinusitis, unspecified chronicity, unspecified location  -     amoxicillin-clavulanate 875-125mg (AUGMENTIN) 875-125 mg per tablet; Take 1 tablet by mouth 2 (two) times daily. for 7 days  Dispense: 14 tablet; Refill: 0    Make sure that you follow up with your primary care doctor in the next 2-5 days if needed .  Go to or return to Urgent Care if signs or symptoms change and certainly if you have worsening and/or severe symptoms go to the nearest emergency department for further evaluation.

## 2024-02-25 NOTE — PATIENT INSTRUCTIONS
YOU TESTED POSITIVE FOR COVID-19 TODAY.  IT IS IMPORTANT THAT YOU FOLLOW THE FOLLOWING ISOLATION GUIDELINES, regardless of vaccination status    Stay home for at least 5 days  Stay home for 5 days and isolate from others in your home.  Wear a well-fitting mask if you must be around others in your home.  Do not travel.    Ending isolation if you had symptoms  End isolation after 5 full days if you are fever-free for 24 hours (without the use of fever-reducing medication) and your symptoms are improving.  Ending isolation if you did NOT have symptoms  End isolation after at least 5 full days after your positive test.  If you got very sick from COVID-19 or have a weakened immune system  You should isolate for at least 10 days. Consult your doctor before ending isolation.    Take precautions until day 10  Wear a well-fitting mask   Wear a well-fitting mask for 10 full days any time you are around others inside your home or in public. Do not go to places where you are unable to wear a mask.  Do not travel  Do not travel until a full 10 days after your symptoms started or the date your positive test was taken if you had no symptoms.  Avoid being around people who are more likely to get very sick from COVID-19.        YOU ARE ENCOURAGED TO USE IBUPROFEN 600 MG EVERY 6 HOURS AS NEEDED FOR FEVER OR ACHES OR HEADACHE.    WITH ANY SEVERE CHEST PAIN OR SHORTNESS OF BREATH, YOU SHOULD GO TO THE EMERGENCY ROOM.

## 2024-02-28 ENCOUNTER — PATIENT MESSAGE (OUTPATIENT)
Dept: RESEARCH | Facility: HOSPITAL | Age: 39
End: 2024-02-28
Payer: COMMERCIAL

## 2024-08-23 DIAGNOSIS — Z00.00 ROUTINE MEDICAL EXAM: Primary | ICD-10-CM

## 2024-09-13 ENCOUNTER — OFFICE VISIT (OUTPATIENT)
Dept: INTERNAL MEDICINE | Facility: CLINIC | Age: 39
End: 2024-09-13
Payer: COMMERCIAL

## 2024-09-13 ENCOUNTER — HOSPITAL ENCOUNTER (OUTPATIENT)
Dept: CARDIOLOGY | Facility: HOSPITAL | Age: 39
Discharge: HOME | End: 2024-09-13
Attending: EMERGENCY MEDICINE
Payer: COMMERCIAL

## 2024-09-13 ENCOUNTER — CLINICAL SUPPORT (OUTPATIENT)
Dept: INTERNAL MEDICINE | Facility: CLINIC | Age: 39
End: 2024-09-13
Payer: COMMERCIAL

## 2024-09-13 VITALS — BODY MASS INDEX: 23.8 KG/M2 | HEIGHT: 71 IN | WEIGHT: 170 LBS

## 2024-09-13 VITALS
BODY MASS INDEX: 24.26 KG/M2 | HEART RATE: 67 BPM | HEIGHT: 71 IN | DIASTOLIC BLOOD PRESSURE: 77 MMHG | WEIGHT: 173.31 LBS | OXYGEN SATURATION: 99 % | SYSTOLIC BLOOD PRESSURE: 112 MMHG

## 2024-09-13 DIAGNOSIS — Z00.00 ROUTINE MEDICAL EXAM: ICD-10-CM

## 2024-09-13 DIAGNOSIS — H93.19 TINNITUS, UNSPECIFIED LATERALITY: ICD-10-CM

## 2024-09-13 DIAGNOSIS — Z00.00 ANNUAL PHYSICAL EXAM: Primary | ICD-10-CM

## 2024-09-13 DIAGNOSIS — R94.39 ABNORMAL STRESS ECG: ICD-10-CM

## 2024-09-13 DIAGNOSIS — Z00.00 ENCOUNTER FOR SCREENING AND PREVENTATIVE CARE: Primary | ICD-10-CM

## 2024-09-13 LAB
ALBUMIN SERPL BCP-MCNC: 4.3 G/DL (ref 3.5–5.2)
ALP SERPL-CCNC: 64 U/L (ref 55–135)
ALT SERPL W/O P-5'-P-CCNC: 70 U/L (ref 10–44)
ANION GAP SERPL CALC-SCNC: 7 MMOL/L (ref 8–16)
AST SERPL-CCNC: 34 U/L (ref 10–40)
BILIRUB SERPL-MCNC: 0.5 MG/DL (ref 0.1–1)
BUN SERPL-MCNC: 16 MG/DL (ref 6–20)
CALCIUM SERPL-MCNC: 9.9 MG/DL (ref 8.7–10.5)
CHLORIDE SERPL-SCNC: 106 MMOL/L (ref 95–110)
CHOLEST SERPL-MCNC: 217 MG/DL (ref 120–199)
CHOLEST/HDLC SERPL: 3.7 {RATIO} (ref 2–5)
CO2 SERPL-SCNC: 28 MMOL/L (ref 23–29)
COMPLEXED PSA SERPL-MCNC: 0.75 NG/ML (ref 0–4)
CREAT SERPL-MCNC: 1 MG/DL (ref 0.5–1.4)
CV STRESS BASE HR: 54 BPM
DIASTOLIC BLOOD PRESSURE: 67 MMHG
ERYTHROCYTE [DISTWIDTH] IN BLOOD BY AUTOMATED COUNT: 13.3 % (ref 11.5–14.5)
EST. GFR  (NO RACE VARIABLE): >60 ML/MIN/1.73 M^2
ESTIMATED AVG GLUCOSE: 97 MG/DL (ref 68–131)
GLUCOSE SERPL-MCNC: 88 MG/DL (ref 70–110)
HBA1C MFR BLD: 5 % (ref 4–5.6)
HCT VFR BLD AUTO: 47.2 % (ref 40–54)
HCV AB SERPL QL IA: NORMAL
HDLC SERPL-MCNC: 58 MG/DL (ref 40–75)
HDLC SERPL: 26.7 % (ref 20–50)
HGB BLD-MCNC: 15.4 G/DL (ref 14–18)
HIV 1+2 AB+HIV1 P24 AG SERPL QL IA: NORMAL
LDLC SERPL CALC-MCNC: 140 MG/DL (ref 63–159)
MCH RBC QN AUTO: 29.5 PG (ref 27–31)
MCHC RBC AUTO-ENTMCNC: 32.6 G/DL (ref 32–36)
MCV RBC AUTO: 90 FL (ref 82–98)
NONHDLC SERPL-MCNC: 159 MG/DL
OHS CV CPX 1 MINUTE RECOVERY HEART RATE: 130 BPM
OHS CV CPX 85 PERCENT MAX PREDICTED HEART RATE MALE: 153
OHS CV CPX ESTIMATED METS: 13
OHS CV CPX MAX PREDICTED HEART RATE: 180
OHS CV CPX PATIENT IS FEMALE: 0
OHS CV CPX PATIENT IS MALE: 1
OHS CV CPX PEAK DIASTOLIC BLOOD PRESSURE: 63 MMHG
OHS CV CPX PEAK HEAR RATE: 153 BPM
OHS CV CPX PEAK RATE PRESSURE PRODUCT: NORMAL
OHS CV CPX PEAK SYSTOLIC BLOOD PRESSURE: 190 MMHG
OHS CV CPX PERCENT MAX PREDICTED HEART RATE ACHIEVED: 85
OHS CV CPX RATE PRESSURE PRODUCT PRESENTING: 5940
PLATELET # BLD AUTO: 229 K/UL (ref 150–450)
PMV BLD AUTO: 10.9 FL (ref 9.2–12.9)
POTASSIUM SERPL-SCNC: 4.5 MMOL/L (ref 3.5–5.1)
PROT SERPL-MCNC: 7.5 G/DL (ref 6–8.4)
RBC # BLD AUTO: 5.22 M/UL (ref 4.6–6.2)
SODIUM SERPL-SCNC: 141 MMOL/L (ref 136–145)
STRESS ECHO POST EXERCISE DUR MIN: 8 MINUTES
STRESS ECHO POST EXERCISE DUR SEC: 0 SECONDS
SYSTOLIC BLOOD PRESSURE: 110 MMHG
TRIGL SERPL-MCNC: 95 MG/DL (ref 30–150)
TSH SERPL DL<=0.005 MIU/L-ACNC: 1.26 UIU/ML (ref 0.4–4)
WBC # BLD AUTO: 5.01 K/UL (ref 3.9–12.7)

## 2024-09-13 PROCEDURE — 93016 CV STRESS TEST SUPVJ ONLY: CPT | Mod: ,,, | Performed by: INTERNAL MEDICINE

## 2024-09-13 PROCEDURE — 86803 HEPATITIS C AB TEST: CPT | Performed by: EMERGENCY MEDICINE

## 2024-09-13 PROCEDURE — 93017 CV STRESS TEST TRACING ONLY: CPT

## 2024-09-13 PROCEDURE — 93018 CV STRESS TEST I&R ONLY: CPT | Mod: ,,, | Performed by: INTERNAL MEDICINE

## 2024-09-13 PROCEDURE — 36415 COLL VENOUS BLD VENIPUNCTURE: CPT | Performed by: EMERGENCY MEDICINE

## 2024-09-13 PROCEDURE — 99999 PR PBB SHADOW E&M-EST. PATIENT-LVL III: CPT | Mod: PBBFAC,,, | Performed by: EMERGENCY MEDICINE

## 2024-09-13 PROCEDURE — 83036 HEMOGLOBIN GLYCOSYLATED A1C: CPT | Performed by: EMERGENCY MEDICINE

## 2024-09-13 PROCEDURE — 80061 LIPID PANEL: CPT | Performed by: EMERGENCY MEDICINE

## 2024-09-13 PROCEDURE — 84153 ASSAY OF PSA TOTAL: CPT | Performed by: EMERGENCY MEDICINE

## 2024-09-13 PROCEDURE — 99999 PR PBB SHADOW E&M-EST. PATIENT-LVL I: CPT | Mod: PBBFAC,,,

## 2024-09-13 PROCEDURE — 87389 HIV-1 AG W/HIV-1&-2 AB AG IA: CPT | Performed by: EMERGENCY MEDICINE

## 2024-09-13 PROCEDURE — 80053 COMPREHEN METABOLIC PANEL: CPT | Performed by: EMERGENCY MEDICINE

## 2024-09-13 PROCEDURE — 85027 COMPLETE CBC AUTOMATED: CPT | Performed by: EMERGENCY MEDICINE

## 2024-09-13 PROCEDURE — 84443 ASSAY THYROID STIM HORMONE: CPT | Performed by: EMERGENCY MEDICINE

## 2024-09-13 RX ORDER — DESONIDE 0.5 MG/G
CREAM TOPICAL 2 TIMES DAILY
COMMUNITY

## 2024-09-13 RX ORDER — NAPROXEN SODIUM 220 MG
220 TABLET ORAL
COMMUNITY

## 2024-09-13 NOTE — PROGRESS NOTES
[facility]      TODAY'S Date 9/13/2024  Patient ID: Jarret Grant is a 39 y.o. male   MRN: [id number]  Primary Care Physician (PCP):  No, Primary Doctor    SUBJECTIVE       Chief Complaint:   Chief Complaint   Patient presents with    [facility]      HPI:   Reviewed medical, surgical, social and family history, medications, appropriate preventive health screenings, as well as vaccination history. Updates as noted below or in assessment and plan.    This is a very pleasant 39 y.o. nonsmoking  male with PMHx Allergy. Although presenting for his usual annual exam in [facility], he is a new patient to me.  The patient is currently in good health, with the exception of moderate lower back pain x5 days.  Patient reports that he jumped off of a slide that was 5 ft off the ground and played football with his kids shortly before his symptoms occurred.  He has some relief with Aleve but does report pain seems to go towards his glutes.  He and his wife of 7 years shared 2 children ages 6 and 4.  The patient himself is a middle child and is currently taking care of his mother who is transitioning to assisted living area. At this time, he works at [employer] has an .  Most work is done in the office in sitting and this mildly exacerbates his back pain although movement makes it worse.  He has not been able to go to the gym because of his discomfort.  Going to the gym for the last month has been a form of stress relief.  He tries to eat healthy with chicken with every or pork groins and vegetables.  He occasionally has a hamburger, chicken cholesterol, or spaghetti.  Free on rare occasions that everything he has extracurricular activities with his children he may eat fast food.  Guilt for past behaviors he goes to bed at approximately 10:00 p.m. and feels this you relatively rested at 6:00 a.m..  This changes often because he considers  himself a restless sleeper.  He tosses and turns often and snores when he is on his back.  Today he reports drinking 1 cup of coffee called and 67 servings of water daily.  He denies drinking carbonated beverages and may have a beer on the while watching football.  She has goal for 2024 is to stay relatively healthy and he is worried about his heart because his dad who passed of multiple myeloma had severe heart disease.  There is no report of trauma, urinary or bowel incontinence, f/c, n/v/d/c, IVDU, abdominal pain, weakness, or sensory deficit.    SCREENING:   Prostate:    UTD, He understands PSA is a screening blood test. We discussed about the pros and cons of PSA testing for prostate cancer screening   Colonoscopy:    n/a as < 45 years old and no family history colon cancer  DEXA:     N/a as < 70 years old and little to no clinical risk factors for fracture independent of bone mineral density .  Depression:    Denies  Anxiety:    Mild related to transitioning his mother to assisted living  Stress: Stress Concern Present (9/6/2024)    [employer] - Occupational Stress Questionnaire     Feeling of Stress : To some extent     Medications:    Reviewed and Reconciled.  Patient reports taking antihistamines like Singulair or Allegra daily which multivitamin.  Eye Exam:    UTD, wears corrective lens  Dental Exam:    Routine q 6 months   Ear Exam:    Sporadic tinnitus.  No complaints of hearing loss, noise exposure, or vertigo   Skin:     No new concerning moles or lesions   Rarely uses sunscreen but may wear long sleeves and hat.  Negative for recent sunburn, moderate sun exposure in the past, several blistering sunburns  No history of tanning bed use or melanoma in first degree relatives.  Pt has had moles removed in the past: yes, benign (back)   Sex:     Monogamous relationship  Transportation safety:   Uses restraint consistently, does not drink alcohol before or while driving  Lung  cancer risk/Tobacco use:   Tobacco Use: Low Risk  (2/25/2024)    Patient History     Smoking Tobacco Use: Never     Smokeless Tobacco Use: Never     Passive Exposure: Not on file        A review of medical records indicates patient has seen the following specialists:   Otolaryngology - Matteo Ross III, MD  Chiropractic Medicine - Shane uBsh DC, CCSP    Immunization History   Administered Date(s) Administered    COVID-19, MRNA, LN-S, PF (Pfizer) (Purple Cap) 03/14/2021, 04/05/2021    Tdap 05/03/2020     Past Medical History:   Diagnosis Date    Allergy      Past Surgical History:   Procedure Laterality Date    COLONOSCOPY      WISDOM TOOTH EXTRACTION       Family History   Problem Relation Name Age of Onset    Diabetes Mother Aylin Grant     Cancer Father Lalo Grant     Arthritis Father Lalo Grant     Heart disease Father Lalo Grant      Social History     Tobacco Use    Smoking status: Never    Smokeless tobacco: Never   Substance Use Topics    Alcohol use: Yes     Alcohol/week: 1.0 standard drink of alcohol     Types: 1 Cans of beer per week    Drug use: Never     Past Medical, Surgical and Social history reviewed and verified by me.     Review of patient's allergies indicates:  No Known Allergies  Current Outpatient Medications on File Prior to Visit   Medication Sig Dispense Refill    desonide (DESOWEN) 0.05 % cream Apply topically 2 (two) times daily.      multivitamin with minerals tablet Take 1 tablet by mouth once daily.      naproxen sodium (ANAPROX) 220 MG tablet Take 220 mg by mouth every 12 (twelve) hours.      [DISCONTINUED] azelastine (ASTELIN) 137 mcg (0.1 %) nasal spray 1 spray (137 mcg total) by Nasal route 2 (two) times daily. 30 mL 0    [DISCONTINUED] clotrimazole-betamethasone 1-0.05% (LOTRISONE) cream Apply to affected area 2 times daily (Patient not taking: Reported on 2/24/2023) 30 g 1    [DISCONTINUED] ketoconazole (NIZORAL) 2 % shampoo       [DISCONTINUED]  "levocetirizine (XYZAL) 5 MG tablet Take 1 tablet (5 mg total) by mouth every evening. 30 tablet 1     No current facility-administered medications on file prior to visit.       Review of Systems as per HPI  Review of Systems   Constitutional:  Negative for malaise/fatigue.   Respiratory:  Negative for cough, hemoptysis, sputum production, shortness of breath, wheezing and stridor.    Cardiovascular:  Negative for chest pain, palpitations, orthopnea, claudication, leg swelling and PND.   Gastrointestinal:  Negative for abdominal pain, blood in stool, constipation, diarrhea, heartburn, melena, nausea and vomiting.   Genitourinary: Negative.    Musculoskeletal:  Positive for back pain. Negative for falls, joint pain, myalgias and neck pain.   Neurological: Negative.    Endo/Heme/Allergies:  Positive for environmental allergies. Negative for polydipsia. Does not bruise/bleed easily.   All other systems reviewed and are negative.        OBJECTIVE     PHYSICAL EXAM  Vitals:    09/13/24 1104   BP: 112/77   Pulse: 67   SpO2: 99%   Weight: 78.6 kg (173 lb 4.5 oz)   Height: 5' 11" (1.803 m)     Vital Signs (Most Recent):  Pulse: 67 (09/13/24 1104)  BP: 112/77 (09/13/24 1104)  SpO2: 99 % (09/13/24 1104)   Weight:   Wt Readings from Last 1 Encounters:   09/13/24 78.6 kg (173 lb 4.5 oz)     Body mass index is 24.17 kg/m².      Vital signs and nursing assessment noted:  Relatively normal vital    GEN:   NAD, A & Ox3, atraumatic, well appearing, nontoxic appearing  HEENT:  PERRLA, EOMI, moist membranes, nl conjunctiva, no scleral icterus, no nystagmus, no nodes/nodules, soft, supple, FROM, no trachial deviation, nexus negative, normal TMs bilaterally, normal pharynx  CV:   RRR no m/r/g, 2+ radial pulses, <2sec cap refill, no obvious JVD  RESP:  CTA B, no w/r/r, equal and bilateral chest rise, no respiratory distress  ABD:   soft, Nontender, Nondistended, +BS, no guarding/rebound  :   Deferred  BACK:  FROM, no midline tenderness, " no paraspinal tenderness  EXT:   FROM, MERRITT x 4, no swelling, no edema, no calf tenderness, no bony tenderness, no warmth or redness, no crepitus, no obvious deformity  LYMPH:  no gross adenopathy  NEURO:  GCS 15, CN II-XII grossly intact, no obvious motor/sensory deficit, no tremor, negative Romberg,  nl gait/coordination  PSYCH:   Cooperative, no SI/HI, no anxiety, nl mood, flat affect, nl judgement/thought process  SKIN:  no rashes/lesions or masses, nl color, no pallor, warm, dry, intact    Tests      Labs reviewed and independently interpreted. Imaging studies reviewed.   Recent Results (from the past 2016 hour(s))   Comprehensive metabolic panel    Collection Time: 09/13/24 10:02 AM   Result Value Ref Range    Sodium 141 136 - 145 mmol/L    Potassium 4.5 3.5 - 5.1 mmol/L    Chloride 106 95 - 110 mmol/L    CO2 28 23 - 29 mmol/L    Glucose 88 70 - 110 mg/dL    BUN 16 6 - 20 mg/dL    Creatinine 1.0 0.5 - 1.4 mg/dL    Calcium 9.9 8.7 - 10.5 mg/dL    Total Protein 7.5 6.0 - 8.4 g/dL    Albumin 4.3 3.5 - 5.2 g/dL    Total Bilirubin 0.5 0.1 - 1.0 mg/dL    Alkaline Phosphatase 64 55 - 135 U/L    AST 34 10 - 40 U/L    ALT 70 (H) 10 - 44 U/L    eGFR >60.0 >60 mL/min/1.73 m^2    Anion Gap 7 (L) 8 - 16 mmol/L   CBC Without Differential    Collection Time: 09/13/24 10:02 AM   Result Value Ref Range    WBC 5.01 3.90 - 12.70 K/uL    RBC 5.22 4.60 - 6.20 M/uL    Hemoglobin 15.4 14.0 - 18.0 g/dL    Hematocrit 47.2 40.0 - 54.0 %    MCV 90 82 - 98 fL    MCH 29.5 27.0 - 31.0 pg    MCHC 32.6 32.0 - 36.0 g/dL    RDW 13.3 11.5 - 14.5 %    Platelets 229 150 - 450 K/uL    MPV 10.9 9.2 - 12.9 fL   Lipid panel    Collection Time: 09/13/24 10:02 AM   Result Value Ref Range    Cholesterol 217 (H) 120 - 199 mg/dL    Triglycerides 95 30 - 150 mg/dL    HDL 58 40 - 75 mg/dL    LDL Cholesterol 140.0 63.0 - 159.0 mg/dL    HDL/Cholesterol Ratio 26.7 20.0 - 50.0 %    Total Cholesterol/HDL Ratio 3.7 2.0 - 5.0    Non-HDL Cholesterol 159 mg/dL    Hemoglobin A1c    Collection Time: 09/13/24 10:02 AM   Result Value Ref Range    Hemoglobin A1C 5.0 4.0 - 5.6 %    Estimated Avg Glucose 97 68 - 131 mg/dL   HIV 1/2 Ag/Ab (4th Gen)    Collection Time: 09/13/24 10:02 AM   Result Value Ref Range    HIV 1/2 Ag/Ab Non-reactive Non-reactive   Hepatitis C Antibody    Collection Time: 09/13/24 10:02 AM   Result Value Ref Range    Hepatitis C Ab Non-reactive Non-reactive   PSA, Screening    Collection Time: 09/13/24 10:02 AM   Result Value Ref Range    PSA, Screen 0.75 0.00 - 4.00 ng/mL   Exercise Stress - EKG    Collection Time: 09/13/24 10:42 AM   Result Value Ref Range    85% Max Predicted      Max Predicted      [facility] CV CPX PATIENT IS MALE 1.0     [facility] CV CPX PATIENT IS FEMALE 0.0     HR at rest 54 bpm    Systolic blood pressure 110 mmHg    Diastolic blood pressure 67 mmHg    RPP 5,940     Exercise duration (min) 8 minutes    Exercise duration (sec) 0 seconds    Peak  bpm    Peak Systolic  mmHg    Peak Diatolic BP 63 mmHg    Peak RPP 29,070     Estimated METs 13     % Max HR Achieved 85     1 Minute Recovery  bpm      XR CHEST PA AND LATERAL Jan 2022 Impression: No acute process seen.    CT MEDTRONIC SINUSES WITHOUT Dec 2019 Impression: No compelling CT evidence of acute or chronic sinusitis.       ASSESSMENT:     1. Encounter for screening and preventative care    2. Tinnitus, unspecified laterality    3. Abnormal stress ECG       39 y.o.  nonsmoking male PMHx allergy everything on antihistamine presents for annual exam in [facility].  Patient reports having lower back pain and some stress related to transitioning his mother to assisted living.  Functionally, the patient does not report limitations due to his symptoms.  Except for flat affect, exam is relatively benign  No obvious skin lesions suspicious for malignancy noted. Home medications (MVI) and health maintenance reviewed.  Immunization status: up to date and documented.  Scheduled cancer screenings completed. The 10-year ASCVD risk score (Ángel LARIOS, et al., 2019) is: 0.8%    MDM  Reviewed: previous chart, nursing note and vitals  Reviewed previous: labs, x-ray and CT scan (Exercise Stress - EKG September 2024 interpretation: ECG portion abnormal but not diagnostic. Exercise capacity normal. No chest pain during stress test.  Blood pressure response to stress normal.  No arrhythmias during stress)  Interpretation: labs (Elevated cholesterol and ALT otherwise relatively unremarkable Lipid Panel, CMP, CBC, TSH, PSA, HgA1C, HIV, Hep C ab)      PLAN:       Periodic health maintenance visits annually or sooner with concerns.   Future vaccinations to be obtained at local pharmacy or with medical provider of patient's choosing  Routine annual labs CMP, CBC, Lipid, HgA1C, TSH, PSA  Continue current medications.   Lipid-lowering therapy was not prescribed due to medical contraindication. No statin recommended because 10-year risk <5%.  Encouraged healthy eating, exercise, weight management and meditation possibly in the form of yoga.   Recommend a high fiber, lean protein diet that is high in complex carbohydrates such as non-starchy vegetables and whole grains.   Recommend activities as tolerated with goal of 150 minutes of moderate-intensity physical activity and 2 days of muscle strengthening activity weekly.  Recommend daily sun protection/avoidance, use of at least SPF 30, broad spectrum sunscreen (OTC drug), and routine physician surveillance to optimize early detection.  Recommend hearing protection when in noisy environments.   Periodic health maintenance visits annually or sooner with concerns. Consider audiograms every 3-5 years or sooner with concerns.    Orders Placed This Encounter   Procedures    Ambulatory referral/consult to Audiology    Ambulatory referral/consult to Cardiology     The results of physical exam findings, labs, and imaging were reviewed with the patient.  Management of above assessments/visit diagnoses was discussed with patient. Precautions for return discussed at length. Patient was given ample time for questions. All questions asked and answered to the satisfaction of the patient. Patient is in agreement with the above and verbalized understanding. Total time spent caring for the patient today was 35 minutes. This includes time spent before the visit reviewing the chart, time spent during the visit, and time spent after the visit on documentation.    Maxi Hernández MD  [facility]    Disclaimer: This document was created using voice recognition software (M*Modal Fluency Direct). Although it may be edited, this document may contain errors related to incorrect recognition of the spoken word. Please contact the physician if clarification is needed.

## 2024-09-30 ENCOUNTER — HOSPITAL ENCOUNTER (OUTPATIENT)
Dept: RADIOLOGY | Facility: HOSPITAL | Age: 39
Discharge: HOME | End: 2024-09-30
Attending: PHYSICIAN ASSISTANT
Payer: COMMERCIAL

## 2024-09-30 ENCOUNTER — PATIENT MESSAGE (OUTPATIENT)
Dept: ADMINISTRATIVE | Facility: OTHER | Age: 39
End: 2024-09-30
Payer: COMMERCIAL

## 2024-09-30 ENCOUNTER — OFFICE VISIT (OUTPATIENT)
Dept: ORTHOPEDICS | Facility: CLINIC | Age: 39
End: 2024-09-30
Payer: COMMERCIAL

## 2024-09-30 ENCOUNTER — DOCUMENTATION ONLY (OUTPATIENT)
Dept: REHABILITATION | Facility: HOSPITAL | Age: 39
End: 2024-09-30
Payer: COMMERCIAL

## 2024-09-30 VITALS — BODY MASS INDEX: 24.26 KG/M2 | WEIGHT: 173.31 LBS | HEIGHT: 71 IN

## 2024-09-30 DIAGNOSIS — M51.369 DDD (DEGENERATIVE DISC DISEASE), LUMBAR: Primary | ICD-10-CM

## 2024-09-30 DIAGNOSIS — M54.16 LUMBAR RADICULOPATHY, CHRONIC: ICD-10-CM

## 2024-09-30 DIAGNOSIS — M54.16 LUMBAR RADICULOPATHY: Primary | ICD-10-CM

## 2024-09-30 DIAGNOSIS — M51.369 DDD (DEGENERATIVE DISC DISEASE), LUMBAR: ICD-10-CM

## 2024-09-30 PROCEDURE — 72110 X-RAY EXAM L-2 SPINE 4/>VWS: CPT | Mod: TC

## 2024-09-30 PROCEDURE — 1159F MED LIST DOCD IN RCRD: CPT | Mod: CPTII,S$GLB,, | Performed by: PHYSICIAN ASSISTANT

## 2024-09-30 PROCEDURE — 72110 X-RAY EXAM L-2 SPINE 4/>VWS: CPT | Mod: 26,,, | Performed by: RADIOLOGY

## 2024-09-30 PROCEDURE — 99999 PR PBB SHADOW E&M-EST. PATIENT-LVL III: CPT | Mod: PBBFAC,,, | Performed by: PHYSICIAN ASSISTANT

## 2024-09-30 PROCEDURE — 3008F BODY MASS INDEX DOCD: CPT | Mod: CPTII,S$GLB,, | Performed by: PHYSICIAN ASSISTANT

## 2024-09-30 PROCEDURE — 99204 OFFICE O/P NEW MOD 45 MIN: CPT | Mod: S$GLB,,, | Performed by: PHYSICIAN ASSISTANT

## 2024-09-30 PROCEDURE — 3044F HG A1C LEVEL LT 7.0%: CPT | Mod: CPTII,S$GLB,, | Performed by: PHYSICIAN ASSISTANT

## 2024-09-30 RX ORDER — METHYLPREDNISOLONE 4 MG/1
TABLET ORAL
Qty: 1 EACH | Refills: 0 | Status: SHIPPED | OUTPATIENT
Start: 2024-09-30

## 2024-09-30 NOTE — PROGRESS NOTES
DATE: 9/30/2024  PATIENT: Jarret Grant    Supervising Physician: Edvin Chan M.D.    CHIEF COMPLAINT: back pain    HISTORY:  Jarret Grant is a 39 y.o. male here for initial evaluation of L>R low back and bilateral glute pain (Back - 10, Leg - 0). The pain has been present for 2-3 weeks. The patient describes the pain as sharp and shooting.  He says the pain started about 2-3 weeks ago after he jumped off a ladder at the playground with his kids.  His pain was starting to improve until this weekend when it began to worsen again.  The pain is worse with walking, getting up/down, and sitting and improved by standing. There is occasionally associated numbness and tingling. There is no subjective weakness. Prior treatments have included aleve, heat and home exercises, but no ESIs or surgery.    The patient denies myelopathic symptoms such as handwriting changes or difficulty with buttons/coins/keys. Denies perineal paresthesias, bowel/bladder dysfunction.    PAST MEDICAL/SURGICAL HISTORY:  Past Medical History:   Diagnosis Date    Allergy      Past Surgical History:   Procedure Laterality Date    COLONOSCOPY      WISDOM TOOTH EXTRACTION         Medications:   Current Outpatient Medications on File Prior to Visit   Medication Sig Dispense Refill    desonide (DESOWEN) 0.05 % cream Apply topically 2 (two) times daily.      multivitamin with minerals tablet Take 1 tablet by mouth once daily.      naproxen sodium (ANAPROX) 220 MG tablet Take 220 mg by mouth every 12 (twelve) hours.       No current facility-administered medications on file prior to visit.       Social History:   Social History     Socioeconomic History    Marital status:    Tobacco Use    Smoking status: Never    Smokeless tobacco: Never   Substance and Sexual Activity    Alcohol use: Yes     Alcohol/week: 1.0 standard drink of alcohol     Types: 1 Cans of beer per week    Drug use: Never    Sexual activity: Yes     Partners: Female  "    Birth control/protection: None   Social History Narrative    ** Merged History Encounter **          Social Drivers of Health     Financial Resource Strain: Low Risk  (9/6/2024)    Overall Financial Resource Strain (CARDIA)     Difficulty of Paying Living Expenses: Not very hard   Food Insecurity: No Food Insecurity (9/6/2024)    Hunger Vital Sign     Worried About Running Out of Food in the Last Year: Never true     Ran Out of Food in the Last Year: Never true   Physical Activity: Insufficiently Active (9/6/2024)    Exercise Vital Sign     Days of Exercise per Week: 2 days     Minutes of Exercise per Session: 30 min   Stress: Stress Concern Present (9/6/2024)    [employer] - Occupational Stress Questionnaire     Feeling of Stress : To some extent   Housing Stability: Unknown (9/6/2024)    Housing Stability Vital Sign     Unable to Pay for Housing in the Last Year: No       REVIEW OF SYSTEMS:  Constitution: Negative. Negative for chills, fever and night sweats.   Cardiovascular: Negative for chest pain and syncope.   Respiratory: Negative for cough and shortness of breath.   Gastrointestinal: See HPI. Negative for nausea/vomiting. Negative for abdominal pain.  Genitourinary: See HPI. Negative for discoloration or dysuria.  Skin: Negative for dry skin, itching and rash.   Hematologic/Lymphatic: Negative for bleeding problem. Does not bruise/bleed easily.   Musculoskeletal: Negative for falls and muscle weakness.   Neurological: See HPI. No seizures.   Endocrine: Negative for polydipsia, polyphagia and polyuria.   Allergic/Immunologic: Negative for hives and persistent infections.     EXAM:  Ht 5' 11" (1.803 m)   Wt 78.6 kg (173 lb 4.5 oz)   BMI 24.17 kg/m²     General: The patient is a very pleasant 39 y.o. male in no apparent distress, the patient is oriented to person, place and time.  Psych: Normal mood and affect  HEENT: Vision grossly intact, hearing intact to the spoken " word.  Lungs: Respirations unlabored.  Gait: Normal station and gait, no difficulty with toe or heel walk.   Skin: Dorsal lumbar skin negative for rashes, lesions, hairy patches and surgical scars. There is mild lumbar tenderness to palpation.  Range of motion: Lumbar range of motion is acceptable.  Spinal Balance: Global saggital and coronal spinal balance acceptable, not significant for scoliosis and kyphosis.  Musculoskeletal: No pain with the range of motion of the bilateral hips. No trochanteric tenderness to palpation.  Vascular: Bilateral lower extremities warm and well perfused, dorsalis pedis pulses 2+ bilaterally.  Neurological: Normal strength and tone in all major motor groups in the bilateral lower extremities. Normal sensation to light touch in the L2-S1 dermatomes bilaterally.  Deep tendon reflexes symmetric 2+ in the bilateral lower extremities.  Negative Babinski bilaterally. Straight leg raise reproduces back pain bilaterally.    IMAGING:      Today I personally reviewed AP, Lat and Flex/Ex  upright L-spine films that demonstrate L5/S1 disc degeneration.      Body mass index is 24.17 kg/m².    Hemoglobin A1C   Date Value Ref Range Status   09/13/2024 5.0 4.0 - 5.6 % Final     Comment:     ADA Screening Guidelines:  5.7-6.4%  Consistent with prediabetes  >or=6.5%  Consistent with diabetes    High levels of fetal hemoglobin interfere with the HbA1C  assay. Heterozygous hemoglobin variants (HbS, HgC, etc)do  not significantly interfere with this assay.   However, presence of multiple variants may affect accuracy.             ASSESSMENT/PLAN:    Jarret was seen today for low-back pain.    Diagnoses and all orders for this visit:    Lumbar radiculopathy    Lumbar radiculopathy, chronic  -     MRI Lumbar Spine Without Contrast; Future    Other orders  -     methylPREDNISolone (MEDROL DOSEPACK) 4 mg tablet; use as directed        The patient is having low back pain radiating to his glutes that has failed  to respond to conservative treatment including medications, activity modification and home exercises.  This pain is interfering with his activities of daily living.  We will get an MRI. I will call with results.

## 2024-10-02 ENCOUNTER — HOSPITAL ENCOUNTER (OUTPATIENT)
Dept: RADIOLOGY | Facility: HOSPITAL | Age: 39
Discharge: HOME | End: 2024-10-02
Attending: PHYSICIAN ASSISTANT
Payer: COMMERCIAL

## 2024-10-02 DIAGNOSIS — M54.16 LUMBAR RADICULOPATHY, CHRONIC: ICD-10-CM

## 2024-10-02 PROCEDURE — 72148 MRI LUMBAR SPINE W/O DYE: CPT | Mod: 26,,, | Performed by: INTERNAL MEDICINE

## 2024-10-02 PROCEDURE — 72148 MRI LUMBAR SPINE W/O DYE: CPT | Mod: TC

## 2024-10-03 ENCOUNTER — OFFICE VISIT (OUTPATIENT)
Dept: ORTHOPEDICS | Facility: CLINIC | Age: 39
End: 2024-10-03
Payer: COMMERCIAL

## 2024-10-03 DIAGNOSIS — M54.16 LUMBAR RADICULOPATHY: Primary | ICD-10-CM

## 2024-10-03 NOTE — PROGRESS NOTES
Established Patient - Audio Only Telehealth Visit     The patient location is: [place]  The chief complaint leading to consultation is: MRI results  Visit type: Virtual visit with audio only (telephone)  Total time spent with patient: 10 minutes       The reason for the audio only service rather than synchronous audio and video virtual visit was related to technical difficulties or patient preference/necessity.     Each patient to whom I provide medical services by telemedicine is:  (1) informed of the relationship between the physician and patient and the respective role of any other health care provider with respect to management of the patient; and (2) notified that they may decline to receive medical services by telemedicine and may withdraw from such care at any time. Patient verbally consented to receive this service via voice-only telephone call.       HPI: the patient presents for MRI results.     MRI lumbar spine demonstrates a bulging disc at L5/S1 with mild stenosis.       Assessment and plan:      Today we discussed at length all of the different treatment options including anti-inflammatories, acetaminophen, rest, ice, heat, physical therapy including strengthening and stretching exercises, home exercises, ROM, aerobic conditioning, aqua therapy, other modalities including ultrasound, massage, and dry needling, epidural steroid injections and finally surgical intervention.      Plan for physical therapy.  He will follow up if he wants to try injection.                          This service was not originating from a related E/M service provided within the previous 7 days nor will  to an E/M service or procedure within the next 24 hours or my soonest available appointment.  Prevailing standard of care was able to be met in this audio-only visit.

## 2024-10-04 ENCOUNTER — CLINICAL SUPPORT (OUTPATIENT)
Dept: REHABILITATION | Facility: HOSPITAL | Age: 39
End: 2024-10-04
Attending: PHYSICIAN ASSISTANT
Payer: COMMERCIAL

## 2024-10-04 DIAGNOSIS — M54.16 LUMBAR RADICULOPATHY: ICD-10-CM

## 2024-10-04 DIAGNOSIS — M54.50 ACUTE BILATERAL LOW BACK PAIN WITHOUT SCIATICA: Primary | ICD-10-CM

## 2024-10-04 PROCEDURE — 97161 PT EVAL LOW COMPLEX 20 MIN: CPT | Mod: PO | Performed by: PHYSICAL THERAPIST

## 2024-10-04 PROCEDURE — 97530 THERAPEUTIC ACTIVITIES: CPT | Mod: PO | Performed by: PHYSICAL THERAPIST

## 2024-10-04 PROCEDURE — 97110 THERAPEUTIC EXERCISES: CPT | Mod: PO | Performed by: PHYSICAL THERAPIST

## 2024-10-04 PROCEDURE — 97140 MANUAL THERAPY 1/> REGIONS: CPT | Mod: PO | Performed by: PHYSICAL THERAPIST

## 2024-10-04 NOTE — PLAN OF CARE
[facility]   Physical Therapy Initial Evaluation      Name: Jarret Grant  [facility] Number: [id number]    Therapy Diagnosis:   Encounter Diagnoses   Name Primary?    Acute bilateral low back pain without sciatica Yes    Lumbar radiculopathy         Physician: Chelle Good P*    Physician Orders: PT Eval and Treat  Medical Diagnosis from Referral: M54.16 (ICD-10-CM) - Lumbar radiculopathy   Evaluation Date: 10/4/2024  Authorization Period Expiration: 10/3/2025  Plan of Care Expiration: 12/27/2024  Visit # / Visits authorized: 1/ 1      Foto  Date  Score    #1/3 10/4/2024 43%   #2/3     #3/3          Precautions: Standard    Time In: 0900  Time Out: 1008  Total Appointment Time (timed & untimed codes): 68 minutes    SUBJECTIVE   Date of onset: 2-3 weeks ago    History of current condition - Jarret reports: he initially hurt his back when he jumped down from a ladder while playing with his kids. Symptoms had begun to improve but have since recently worsened on 9/27/2024. He had sharp, shooting pain in the left leg as distal as the knee and paresthesias as distal as the left foot. Has been on medrol dose pack with improved symptoms (10/5 is the last day). Standing is the best position and sitting is the worst. No issues with sleep. He is a side sleeper. Symptoms better in the morning. Concurrent left lower abdominal pain.    Falls: None    Imaging, MRI studies (10/2/2024): Central extrusion at L5-S1, which abuts the descending S1 nerve roots bilaterally. Degenerative changes elsewhere in the spine as detailed above.    Prior Therapy: None  Social History: 2 story house; lives with their family  Occupation:   Prior Level of Function: No pain or difficulties with activities of daily living and recreation.  Current Level of Function: mild to moderate pain and difficulties with activities of daily living and recreation.    Pain:  Current 4/10, worst 8/10, best 2/10   Location:  bilateral low back; occasionally Left lower extremity  Description: Grabbing, Sharp, and Shooting  Aggravating Factors: sitting, bending, squatting, transitional movements.  Easing Factors: medication, standing, walking    Patients goals: To be able to sit and play with kids without pain.     Medical History:   Past Medical History:   Diagnosis Date    Allergy      Surgical History:   Jarret Grant  has a past surgical history that includes Colonoscopy and [place] tooth extraction.    Medications:   Jarret has a current medication list which includes the following prescription(s): desonide, methylprednisolone, multivitamin with minerals, and naproxen sodium.    Allergies:   Review of patient's allergies indicates:  No Known Allergies     OBJECTIVE     Observations:  Cervical: forward head and rounded shoulders  Thoracic: reduced kyphosis  Lumbar: reduced lordosis  Pelvis: no asymmetries or leg length discrepancies      Gait:  Right Compensated Trendelenburg  Right knee valgus      Active Range of Motion:  Lumbar Spine   Action Degrees Dysfunction   Flexion 35 To knees - lumbar extension on rising   Extension 5 Reproduction of left symptoms   Left Rotation 45% Tightness   Right Rotation 70% None   Left Sidebend 25 None   Right Sidebend 20 None        Passive Range of Motion:  Lower Extremity   Action Left Right   Hip Flexion 120 degrees 120 degrees   Hip Extension 15 degrees 15 degrees   Hip Abduction 40 degrees 40 degrees   Hip Internal Rotation 35 degrees 35 degrees   Hip External Rotation 30 degrees 30 degrees   Knee Flexion 140 degrees 140 degrees   Knee Extension +1 degrees +1 degrees       Manual Muscle Testing:  Lower Extremity   Action Left Right   Hip Flexion 4 / 5 4 / 5   Hip Extension 4+ / 5 4+ / 5   Hip Abduction 4 / 5 4 / 5   Hip External Rotation 3 / 5 3 / 5   Knee Flexion 5 / 5 5 / 5   Knee Extension 5 / 5 5 / 5   Ankle Dorsiflexion 5 / 5  5 / 5       Special Tests:  Positive (+)  Tests:  Slump  Straight Leg Raise  Flick Test (Left hypomobility)  Negative (-) Tests:  Thigh thrust  Sacral compression  ASIS compression / distraction  Prone instability  DTR:  S1 Left = 3+  S1 Right = 2+  No myotomal weakness      Balance:  Single Limb Stance:  Left / Eyes Open / Firm = 10 seconds; non-antalgic; hip AGMR  Right / Eyes Open / Firm = 10 seconds; non-antalgic; Left hip hike      Palpation:  Lumbar hypomobility at L5-S1  Left SI hypomobility      Intake Outcome Measure for FOTO Lumbar Spine Survey    Therapist reviewed FOTO scores for Jarret Grant on 10/4/2024.   FOTO documents entered into [employer] - see Media section.    Intake Score: 43%           TREATMENT     Total Treatment time (time-based codes) separate from Evaluation: 28 minutes      Jarret received the treatments listed below:      Therapeutic Exercises to develop strength, endurance, ROM, and core stabilization for 10 minutes including:  Bridge  Bent Knee Fall Out  Braced Marches  Clamshells      Manual Therapy Techniques: Joint mobilizations were applied to the: pelvis for 8 minutes, including:  Left prone SI distraction - grade V      Therapeutic Activities to improve functional performance for 10 minutes, including:  Education (see below)  Questions and answers    PATIENT EDUCATION AND HOME EXERCISES     Education provided:   Anatomy and Pathology.  Symptom management and plan of care progressions.  Home Exercise Program.    Written Home Exercises Provided: yes. Exercises were reviewed and Jarret was able to demonstrate them prior to the end of the session.  Jarret demonstrated good  understanding of the education provided. See EMR under Patient Instructions for exercises provided during therapy sessions.    ASSESSMENT     Jarret is a 39 y.o. male referred to outpatient Physical Therapy with a medical diagnosis of Lumbar Radiculopathy. Patient presents with decreased range of motion, decreased strength, gait deviations, and joint  hypomobility. Signs and symptoms are consistent with the low back pain classification of movement coordination impairments (stabilization). No true directional preference present. He will benefit from skilled physical therapy addressing his transverse abdominus training, lumbopelvic control, core utilization, and hip strengthening.    Patient prognosis is Good.   Patientt will benefit from skilled outpatient Physical Therapy to address the deficits stated above and in the chart below, provide patient /family education, and to maximize patientt's level of independence.     Plan of care discussed with patient: Yes  Patient's spiritual, cultural and educational needs considered and patient is agreeable to the plan of care and goals as stated below:     Anticipated Barriers for therapy: None    Medical Necessity is demonstrated by the following  History  Co-morbidities and personal factors that may impact the plan of care [x] LOW: no personal factors / co-morbidities  [] MODERATE: 1-2 personal factors / co-morbidities  [] HIGH: 3+ personal factors / co-morbidities    Moderate / High Support Documentation:   Co-morbidities affecting plan of care: None    Personal Factors:   no deficits     Examination  Body Structures and Functions, activity limitations and participation restrictions that may impact the plan of care [] LOW: addressing 1-2 elements  [] MODERATE: 3+ elements  [x] HIGH: 4+ elements (please support below)    Moderate / High Support Documentation: sitting, bending, lifting, transfers, carrying     Clinical Presentation [x] LOW: stable  [] MODERATE: Evolving  [] HIGH: Unstable     Decision Making/ Complexity Score: low     Goals:  Short Term Goals: 4-5 weeks   Patient will have reduced pain complaints from 8/10 to less than or equal to 4/10. (Not Met - Progressing)  Patient will demonstrate increased AROM/PROM by approximately 25% to 50% of initial measurements. (Not Met - Progressing)  Patient will  demonstrate increased muscle strength of at least one-half grade as compared to the initial measurements. (Not Met - Progressing)    Long Term Goals: 8-10 weeks   Patient will have reduced pain complaints from 4/10 to less than or equal to 0/10. (Not Met - Progressing)  Patient will demonstrate increased AROM/PROM by approximately 75% to 100% of initial measurements. (Not Met - Progressing)  Patient will demonstrate increased muscle strength of at least one grade as compared to the initial measurements. (Not Met - Progressing)  Patient will be able to perform a dead lift of greater than or equal to 30 pounds with good hip hinging mechanics and no pain. (Not Met - Progressing)   Patient will improve Lumbar Spine FOTO Intake score from 43% to greater than or equal to 67%. (Not Met - Progressing)  Patient will be independent with their home exercise program. (Not Met - Progressing)    PLAN   Plan of care Certification: 10/4/2024 to 12/27/2024.    Outpatient Physical Therapy 1 times weekly for 10 weeks to include the following interventions: Gait Training, Manual Therapy, Neuromuscular Re-ed, Patient Education, Self Care, Therapeutic Activities, Therapeutic Exercise, and dry needling.       Elvis Mitchell, PT  Board Certified Orthopedic Specialist

## 2024-10-04 NOTE — PROGRESS NOTES
[facility]   Physical Therapy Treatment Note        Name: Jarret Walden Grant  [facility] Number: [id number]    Therapy Diagnosis:   Encounter Diagnosis   Name Primary?    Acute bilateral low back pain without sciatica Yes     Physician: Chelle Good P*    Visit Date: 10/10/2024  Physician Orders: PT Eval and Treat  Medical Diagnosis from Referral: M54.16 (ICD-10-CM) - Lumbar radiculopathy   Evaluation Date: 10/4/2024  Authorization Period Expiration: 10/3/2025  Plan of Care Expiration: 12/27/2024  Visit # / Visits authorized: 1/ 20     Foto  Date  Score    #1/3 10/4/2024 43%   #2/3       #3/3          Time In: 0800  Time Out: 0900  Total Billable Time: 53 minutes    Precautions: Standard    SUBJECTIVE     Pt reports: increase in symptoms following completion of medrol dose pack. Decreased right sided back pain and scrotal pain.  He was compliant with home exercise program.  Response to previous treatment: Evaluation  Functional change: Ongoing    Pain: 4/10  Location: bilateral low back    OBJECTIVE     Objective Measures updated at progress report unless specified.     TREATMENT     Jarret received the treatments listed below:      Therapeutic Exercises to develop strength, endurance, ROM, posture, and core stabilization for 12 minutes including:  Bridge - 3x10  Clamshells - green band; 3x10 bilateral  Seated Lumbar Flexion - x20      Manual Therapy Techniques: Joint mobilizations were applied to the: spine for 8 minutes, including:  Left L2-L3 lumbar rotation mobilization - grade V  Supine thoracic extension mobilization - grade V    Not Today:  Left prone SI distraction - grade V       Neuromuscular Re-education activities to improve: Proprioception, Posture, and motor control for 18 minutes. The following activities were included:  TA activation with BP cuff - 10 seconds x10  Bent Knee Fall Out with BP cuff - x15 bilateral   Marches - with BP cuff - x15 bilateral      Therapeutic  Activities to improve functional performance for 15 minutes, including:  Education (see below)  Questions and answers      PATIENT EDUCATION AND HOME EXERCISES      Home Exercises Provided and Patient Education Provided     Education provided:   Anatomy and Pathology.  Symptom management and plan of care progressions.  Home Exercise Program.    Written Home Exercises Provided: Patient instructed to cont prior HEP. Exercises were reviewed and Jarret was able to demonstrate them prior to the end of the session.  Jarret demonstrated good  understanding of the education provided. See EMR under Patient Instructions for exercises provided during therapy sessions    ASSESSMENT     Jarret presents back to the clinic for his first follow-up appointment. Negative right sided back pain since the evaluation and reduced left sided back pain. Increased cardinal lumbar range of motion following manual techniques. Performed and reviewed exercises in home exercise program with mild cuing needed for correct performance (stay out of painful ranges). No exacerbation in symptoms upon completion with maintenance of increased range.     From evaluation on 10/4/2024 - Jarret is a 39 y.o. male referred to outpatient Physical Therapy with a medical diagnosis of Lumbar Radiculopathy. Patient presents with decreased range of motion, decreased strength, gait deviations, and joint hypomobility. Signs and symptoms are consistent with the low back pain classification of movement coordination impairments (stabilization). No true directional preference present. He will benefit from skilled physical therapy addressing his transverse abdominus training, lumbopelvic control, core utilization, and hip strengthening.    Jarret Is progressing well towards his goals.   Pt prognosis is Good.     Pt will continue to benefit from skilled outpatient physical therapy to address the deficits listed in the problem list box on initial evaluation, provide pt/family  education and to maximize pt's level of independence in the home and community environment. Pt's spiritual, cultural and educational needs considered and pt agreeable to plan of care and goals.     Anticipated barriers to physical therapy: none    Goals:  Short Term Goals: 4-5 weeks   Patient will have reduced pain complaints from 8/10 to less than or equal to 4/10. (Not Met - Progressing)  Patient will demonstrate increased AROM/PROM by approximately 25% to 50% of initial measurements. (Not Met - Progressing)  Patient will demonstrate increased muscle strength of at least one-half grade as compared to the initial measurements. (Not Met - Progressing)     Long Term Goals: 8-10 weeks   Patient will have reduced pain complaints from 4/10 to less than or equal to 0/10. (Not Met - Progressing)  Patient will demonstrate increased AROM/PROM by approximately 75% to 100% of initial measurements. (Not Met - Progressing)  Patient will demonstrate increased muscle strength of at least one grade as compared to the initial measurements. (Not Met - Progressing)  Patient will be able to perform a dead lift of greater than or equal to 30 pounds with good hip hinging mechanics and no pain. (Not Met - Progressing)   Patient will improve Lumbar Spine FOTO Intake score from 43% to greater than or equal to 67%. (Not Met - Progressing)  Patient will be independent with their home exercise program. (Not Met - Progressing)    PLAN     Lumbar Stabilization program.    Plan of care Certification: 10/4/2024 to 12/27/2024.  Outpatient Physical Therapy 1 times weekly for 10 weeks.    Elvis Mitchell, PT , DPT, OCS

## 2024-10-09 ENCOUNTER — OFFICE VISIT (OUTPATIENT)
Dept: INTERNAL MEDICINE | Facility: CLINIC | Age: 39
End: 2024-10-09
Payer: COMMERCIAL

## 2024-10-09 ENCOUNTER — HOSPITAL ENCOUNTER (OUTPATIENT)
Dept: RADIOLOGY | Facility: HOSPITAL | Age: 39
Discharge: HOME | End: 2024-10-09
Attending: INTERNAL MEDICINE
Payer: COMMERCIAL

## 2024-10-09 VITALS
HEART RATE: 70 BPM | SYSTOLIC BLOOD PRESSURE: 120 MMHG | DIASTOLIC BLOOD PRESSURE: 78 MMHG | TEMPERATURE: 97 F | WEIGHT: 165.56 LBS | BODY MASS INDEX: 22.43 KG/M2 | OXYGEN SATURATION: 100 % | HEIGHT: 72 IN | RESPIRATION RATE: 20 BRPM

## 2024-10-09 DIAGNOSIS — N50.819 TESTICULAR DISCOMFORT: ICD-10-CM

## 2024-10-09 DIAGNOSIS — R10.9 LEFT SIDED ABDOMINAL PAIN: ICD-10-CM

## 2024-10-09 DIAGNOSIS — R10.9 ABDOMINAL PAIN, UNSPECIFIED ABDOMINAL LOCATION: ICD-10-CM

## 2024-10-09 DIAGNOSIS — R10.9 LEFT SIDED ABDOMINAL PAIN: Primary | ICD-10-CM

## 2024-10-09 LAB
BILIRUB SERPL-MCNC: NEGATIVE MG/DL
BLOOD URINE, POC: NEGATIVE
CLARITY, POC UA: CLEAR
COLOR, POC UA: YELLOW
GLUCOSE UR QL STRIP: NORMAL
KETONES UR QL STRIP: NEGATIVE
LEUKOCYTE ESTERASE URINE, POC: NEGATIVE
NITRITE, POC UA: NEGATIVE
PH, POC UA: 7
PROTEIN, POC: 30
SPECIFIC GRAVITY, POC UA: 1010
UROBILINOGEN, POC UA: NORMAL

## 2024-10-09 PROCEDURE — 1159F MED LIST DOCD IN RCRD: CPT | Mod: CPTII,S$GLB,, | Performed by: INTERNAL MEDICINE

## 2024-10-09 PROCEDURE — 3074F SYST BP LT 130 MM HG: CPT | Mod: CPTII,S$GLB,, | Performed by: INTERNAL MEDICINE

## 2024-10-09 PROCEDURE — 81002 URINALYSIS NONAUTO W/O SCOPE: CPT | Mod: S$GLB,,, | Performed by: INTERNAL MEDICINE

## 2024-10-09 PROCEDURE — 74176 CT ABD & PELVIS W/O CONTRAST: CPT | Mod: 26,,, | Performed by: RADIOLOGY

## 2024-10-09 PROCEDURE — 3044F HG A1C LEVEL LT 7.0%: CPT | Mod: CPTII,S$GLB,, | Performed by: INTERNAL MEDICINE

## 2024-10-09 PROCEDURE — 74176 CT ABD & PELVIS W/O CONTRAST: CPT | Mod: TC

## 2024-10-09 PROCEDURE — 99214 OFFICE O/P EST MOD 30 MIN: CPT | Mod: S$GLB,,, | Performed by: INTERNAL MEDICINE

## 2024-10-09 PROCEDURE — 3008F BODY MASS INDEX DOCD: CPT | Mod: CPTII,S$GLB,, | Performed by: INTERNAL MEDICINE

## 2024-10-09 PROCEDURE — 3078F DIAST BP <80 MM HG: CPT | Mod: CPTII,S$GLB,, | Performed by: INTERNAL MEDICINE

## 2024-10-09 PROCEDURE — 99999 PR PBB SHADOW E&M-EST. PATIENT-LVL III: CPT | Mod: PBBFAC,,, | Performed by: INTERNAL MEDICINE

## 2024-10-10 ENCOUNTER — CLINICAL SUPPORT (OUTPATIENT)
Dept: REHABILITATION | Facility: HOSPITAL | Age: 39
End: 2024-10-10
Payer: COMMERCIAL

## 2024-10-10 DIAGNOSIS — M54.50 ACUTE BILATERAL LOW BACK PAIN WITHOUT SCIATICA: Primary | ICD-10-CM

## 2024-10-10 PROCEDURE — 97530 THERAPEUTIC ACTIVITIES: CPT | Mod: PO | Performed by: PHYSICAL THERAPIST

## 2024-10-10 PROCEDURE — 97112 NEUROMUSCULAR REEDUCATION: CPT | Mod: PO | Performed by: PHYSICAL THERAPIST

## 2024-10-10 PROCEDURE — 97140 MANUAL THERAPY 1/> REGIONS: CPT | Mod: PO | Performed by: PHYSICAL THERAPIST

## 2024-10-10 PROCEDURE — 97110 THERAPEUTIC EXERCISES: CPT | Mod: PO | Performed by: PHYSICAL THERAPIST

## 2024-10-11 ENCOUNTER — PATIENT MESSAGE (OUTPATIENT)
Dept: INTERNAL MEDICINE | Facility: CLINIC | Age: 39
End: 2024-10-11
Payer: COMMERCIAL

## 2024-10-16 NOTE — PROGRESS NOTES
[facility]   Physical Therapy Treatment Note        Name: Jarret Walden Grant  [facility] Number: [id number]    Therapy Diagnosis:   Encounter Diagnosis   Name Primary?    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 Quick recovery and no symptoms today.  He was compliant with home exercise program.  Response to previous treatment: mild soreness  Functional change: increasing lifting, carrying, and transfers.    Pain: 4/10  Location: bilateral low back    OBJECTIVE     Objective Measures updated at progress report unless specified.     TREATMENT     Jarret received the treatments listed below:      Therapeutic Exercises to develop strength, endurance, ROM, posture, and core stabilization for 00 minutes including:  Bridge - 3x10  Clamshells - green band; 3x10 bilateral  Seated Lumbar Flexion - x20      Manual Therapy Techniques: Joint mobilizations were applied to the: spine for 8 minutes, including:  Left L5-S1 rotation mobilization - grade V  Supine thoracic extension mobilization - grade V    Not Today:  Left prone SI distraction - grade V   Left L2-L3 lumbar rotation mobilization - grade V      Neuromuscular Re-education activities to improve: Proprioception, Posture, and motor control for 35 minutes. The following activities were included:  Bent Knee Fall Out with BP cuff - x20 bilateral   Marches -  with BP cuff - x20 bilateral  Pallof Press - green handles; x20 bilateral  Step Up with Pull Down - blue + 6 inch box; x20 bilateral    Therapeutic Activities to improve functional performance for 10 minutes, including:  Tidal Tank Carries - 1 clinic lap  Education (see below)  Questions and answers      PATIENT EDUCATION AND HOME EXERCISES      Home Exercises Provided and Patient Education Provided     Education provided:   Anatomy and Pathology.  Symptom management and plan of care progressions.  Home Exercise Program.    Written Home Exercises Provided: Patient instructed to cont prior HEP. Exercises were reviewed and Jarret was able to demonstrate them prior to the end of the session.  Jarret demonstrated good  understanding of the education provided. See EMR under Patient Instructions for exercises provided during therapy sessions    ASSESSMENT     Jarret returns to the clinic with increased lumbar range of motion and decreased symptoms. Ongoing hypomobility in the left lower lumbar spine which was addressed with manual techniques. Continued corrective exercise program addressing lumbar stabilization and force closure. Appropriately challenged with today's exercises with no exacerbation in symptoms. Proceed as indicated at the next visit.     From evaluation on 10/4/2024 - Jarret is a 39 y.o. male referred to outpatient Physical Therapy with a medical diagnosis of Lumbar Radiculopathy. Patient presents with decreased range of motion, decreased strength, gait deviations, and joint hypomobility. Signs and symptoms are consistent with the low back pain classification of movement coordination impairments (stabilization). No true directional preference present. He will benefit from skilled physical therapy addressing his transverse abdominus training, lumbopelvic control, core utilization, and hip strengthening.    Jarret Is progressing well towards his goals.   Pt prognosis is Good.     Pt will continue to benefit from  skilled outpatient physical therapy to address the deficits listed in the problem list box on initial evaluation, provide pt/family education and to maximize pt's level of independence in the home and community environment. Pt's spiritual, cultural and educational needs considered and pt agreeable to plan of care and goals.     Anticipated barriers to physical therapy: none    Goals:  Short Term Goals: 4-5 weeks   Patient will have reduced pain complaints from 8/10 to less than or equal to 4/10. (Met - 10/17/2024)  Patient will demonstrate increased AROM/PROM by approximately 25% to 50% of initial measurements. (Not Met - Progressing)  Patient will demonstrate increased muscle strength of at least one-half grade as compared to the initial measurements. (Not Met - Progressing)     Long Term Goals: 8-10 weeks   Patient will have reduced pain complaints from 4/10 to less than or equal to 0/10. (Not Met - Progressing)  Patient will demonstrate increased AROM/PROM by approximately 75% to 100% of initial measurements. (Not Met - Progressing)  Patient will demonstrate increased muscle strength of at least one grade as compared to the initial measurements. (Not Met - Progressing)  Patient will be able to perform a dead lift of greater than or equal to 30 pounds with good hip hinging mechanics and no pain. (Not Met - Progressing)   Patient will improve Lumbar Spine FOTO Intake score from 43% to greater than or equal to 67%. (Not Met - Progressing)  Patient will be independent with their home exercise program. (Not Met - Progressing)    PLAN     Lumbar Stabilization program.    Plan of care Certification: 10/4/2024 to 12/27/2024.  Outpatient Physical Therapy 1 times weekly for 10 weeks.    Elvis Mitchell, PT , DPT, OCS

## 2024-10-17 ENCOUNTER — CLINICAL SUPPORT (OUTPATIENT)
Dept: REHABILITATION | Facility: HOSPITAL | Age: 39
End: 2024-10-17
Payer: COMMERCIAL

## 2024-10-17 DIAGNOSIS — M54.50 ACUTE BILATERAL LOW BACK PAIN WITHOUT SCIATICA: Primary | ICD-10-CM

## 2024-10-17 PROCEDURE — 97112 NEUROMUSCULAR REEDUCATION: CPT | Mod: PO | Performed by: PHYSICAL THERAPIST

## 2024-10-17 PROCEDURE — 97140 MANUAL THERAPY 1/> REGIONS: CPT | Mod: PO | Performed by: PHYSICAL THERAPIST

## 2024-10-17 PROCEDURE — 97530 THERAPEUTIC ACTIVITIES: CPT | Mod: PO | Performed by: PHYSICAL THERAPIST

## 2024-10-24 NOTE — PROGRESS NOTES
[facility]   Physical Therapy Treatment Note        Name: Jarret Walden Grant  [facility] Number: [id number]    Therapy Diagnosis:   Encounter Diagnosis   Name Primary?    Acute bilateral low back pain without sciatica Yes     Physician: Chelle Good P*    Visit Date: 10/25/2024  Physician Orders: PT Eval and Treat  Medical Diagnosis from Referral: M54.16 (ICD-10-CM) - Lumbar radiculopathy   Evaluation Date: 10/4/2024  Authorization Period Expiration: 10/3/2025  Plan of Care Expiration: 12/27/2024  Visit # / Visits authorized: 3/ 20     Foto  Date  Score    #1/3 10/4/2024 43%   #2/3  10/25/2024 59%    #3/3          Time In: 0800  Time Out: 0903  Total Billable Time: 57 minutes    Precautions: Standard    SUBJECTIVE     Pt reports: [place] felt [place] [employer]. Has some stiffness but no pain following traveling and playing with his kids.  He was compliant with home exercise program.  Response to previous treatment: mild soreness  Functional change: increasing lifting, carrying, and transfers.    Pain: 4/10  Location: bilateral low back    OBJECTIVE     10/25/2024  Lumbar flexion = 55 degrees (no pain or aberrant motion)    TREATMENT     Jarret received the treatments listed below:      Therapeutic Exercises to develop strength, endurance, ROM, posture, and core stabilization for 00 minutes including:  Bridge - 3x10  Clamshells - green band; 3x10 bilateral  Seated Lumbar Flexion - x20      Manual Therapy Techniques: Joint mobilizations were applied to the: spine for 12 minutes, including:  Right prone SI distraction - grade V   Left L4-L5 lumbar rotation mobilization - grade V  Supine thoracic extension mobilization - grade V  Lumbar FMP    Not Today:  Left L5-S1 rotation mobilization - grade V      Neuromuscular Re-education activities to improve: Proprioception, Posture, and motor control for 30 minutes. The following activities were included:  Marches - with BP cuff - x20  bilateral  Straight Leg Raise with BP cuff - 2x10 bilateral  Pallof Press - green handles; x20 bilateral    Not Today:  Bent Knee Fall Out with BP cuff - x20 bilateral   Step Up with Pull Down - blue + 6 inch box; x20 bilateral      Therapeutic Activities to improve functional performance for 15 minutes, including:  Hip Hinging - x30  Dead Lifts - dowel + 10#; x30  Education (see below)  Questions and answers    Not Today:  Tidal Tank Carries - 1 clinic lap      PATIENT EDUCATION AND HOME EXERCISES      Home Exercises Provided and Patient Education Provided     Education provided:   Anatomy and Pathology.  Symptom management and plan of care progressions.  Home Exercise Program.    Written Home Exercises Provided: Patient instructed to cont prior HEP. Exercises were reviewed and Jarret was able to demonstrate them prior to the end of the session.  Jarret demonstrated good  understanding of the education provided. See EMR under Patient Instructions for exercises provided during therapy sessions    ASSESSMENT     Jarret returns to the clinic with minimal  lumbar pain and great carryover in range of motion. Improving hypomobility in the left lower lumbar spine. Utilized lumbar soft tissue techniques to improve exercise gains. Progressed corrective exercise program with advanced stabilization work and introducing hip hinging and dead lifts. Appropriately challenged with today's exercises with no exacerbation in symptoms. Progressing well under current plan of care.     From evaluation on 10/4/2024 - Jarret is a 39 y.o. male referred to outpatient Physical Therapy with a medical diagnosis of Lumbar Radiculopathy. Patient presents with decreased range of motion, decreased strength, gait deviations, and joint hypomobility. Signs and symptoms are consistent with the low back pain classification of movement coordination impairments (stabilization). No true directional preference present. He will benefit from skilled physical  therapy addressing his transverse abdominus training, lumbopelvic control, core utilization, and hip strengthening.    Jarret Is progressing well towards his goals.   Pt prognosis is Good.     Pt will continue to benefit from skilled outpatient physical therapy to address the deficits listed in the problem list box on initial evaluation, provide pt/family education and to maximize pt's level of independence in the home and community environment. Pt's spiritual, cultural and educational needs considered and pt agreeable to plan of care and goals.     Anticipated barriers to physical therapy: none    Goals:  Short Term Goals: 4-5 weeks   Patient will have reduced pain complaints from 8/10 to less than or equal to 4/10. (Met - 10/17/2024)  Patient will demonstrate increased AROM/PROM by approximately 25% to 50% of initial measurements. (Met - 10/25/2024)  Patient will demonstrate increased muscle strength of at least one-half grade as compared to the initial measurements. (Not Met - Progressing)     Long Term Goals: 8-10 weeks   Patient will have reduced pain complaints from 4/10 to less than or equal to 0/10. (Not Met - Progressing)  Patient will demonstrate increased AROM/PROM by approximately 75% to 100% of initial measurements. (Not Met - Progressing)  Patient will demonstrate increased muscle strength of at least one grade as compared to the initial measurements. (Not Met - Progressing)  Patient will be able to perform a dead lift of greater than or equal to 30 pounds with good hip hinging mechanics and no pain. (Not Met - Progressing)   Patient will improve Lumbar Spine FOTO Intake score from 43% to greater than or equal to 67%. (Not Met - Progressing)  Patient will be independent with their home exercise program. (Not Met - Progressing)    PLAN     Lumbar Stabilization program.    Plan of care Certification: 10/4/2024 to 12/27/2024.  Outpatient Physical Therapy 1 times weekly for 10 weeks.    Elvis Mitchell, PT ,  DPT, OCS

## 2024-10-25 ENCOUNTER — OFFICE VISIT (OUTPATIENT)
Dept: CARDIOLOGY | Facility: CLINIC | Age: 39
End: 2024-10-25
Payer: COMMERCIAL

## 2024-10-25 ENCOUNTER — CLINICAL SUPPORT (OUTPATIENT)
Dept: REHABILITATION | Facility: HOSPITAL | Age: 39
End: 2024-10-25
Payer: COMMERCIAL

## 2024-10-25 ENCOUNTER — CLINICAL SUPPORT (OUTPATIENT)
Dept: AUDIOLOGY | Facility: CLINIC | Age: 39
End: 2024-10-25
Payer: COMMERCIAL

## 2024-10-25 VITALS
BODY MASS INDEX: 22.35 KG/M2 | SYSTOLIC BLOOD PRESSURE: 118 MMHG | OXYGEN SATURATION: 100 % | HEIGHT: 72 IN | HEART RATE: 78 BPM | DIASTOLIC BLOOD PRESSURE: 64 MMHG | WEIGHT: 165 LBS

## 2024-10-25 DIAGNOSIS — Z82.49 FAMILY HISTORY OF PREMATURE CAD: ICD-10-CM

## 2024-10-25 DIAGNOSIS — H91.93 HIGH FREQUENCY HEARING LOSS, BILATERAL: ICD-10-CM

## 2024-10-25 DIAGNOSIS — R74.01 ALT (SGPT) LEVEL RAISED: ICD-10-CM

## 2024-10-25 DIAGNOSIS — H93.19 TINNITUS, UNSPECIFIED LATERALITY: Primary | ICD-10-CM

## 2024-10-25 DIAGNOSIS — R94.39 ABNORMAL STRESS ECG: Primary | ICD-10-CM

## 2024-10-25 DIAGNOSIS — M54.50 ACUTE BILATERAL LOW BACK PAIN WITHOUT SCIATICA: Primary | ICD-10-CM

## 2024-10-25 PROCEDURE — 99999 PR PBB SHADOW E&M-EST. PATIENT-LVL II: CPT | Mod: PBBFAC,,,

## 2024-10-25 PROCEDURE — 97140 MANUAL THERAPY 1/> REGIONS: CPT | Mod: PO | Performed by: PHYSICAL THERAPIST

## 2024-10-25 PROCEDURE — 97530 THERAPEUTIC ACTIVITIES: CPT | Mod: PO | Performed by: PHYSICAL THERAPIST

## 2024-10-25 PROCEDURE — 97112 NEUROMUSCULAR REEDUCATION: CPT | Mod: PO | Performed by: PHYSICAL THERAPIST

## 2024-10-25 PROCEDURE — 99999 PR PBB SHADOW E&M-EST. PATIENT-LVL IV: CPT | Mod: PBBFAC,,, | Performed by: INTERNAL MEDICINE

## 2024-10-25 RX ORDER — CETIRIZINE HYDROCHLORIDE 5 MG/1
5 TABLET, CHEWABLE ORAL DAILY
COMMUNITY

## 2024-10-25 NOTE — PROGRESS NOTES
"Jarret Grant was seen today in the clinic for an audiologic evaluation.  Patient's main complaint was tinnitus, bilaterally. Mr. Grant reported occasional "ringing" in the ears. He reported he does perceive difficulty hearing, but that his wife has mentioned concerns with his hearing. 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 He denied otalgia, aural fullness, and true vertigo.    Tympanometry revealed Type A in the right ear and Type A in the left ear.     Audiogram results revealed essentially normal hearing sensitivity with a mild high frequency hearing loss in the left ear.      Speech reception thresholds were noted at 5 dBHL in the right ear and 10 dBHL in the left ear.    Speech discrimination scores were 100% in the right ear and 100% in the left ear.    Recommendations:  Otologic evaluation  Repeat audiogram as needed or sooner if change perceived  Hearing protection in noise        "

## 2024-10-25 NOTE — PROGRESS NOTES
Subjective:   Patient ID:  Jarret Major is a 39 y.o. male who presents for evaluation of Abnormal ECG      HPI: Very pleasant man who had an ECG treadmill stress test during routine screening with [facility] program here at [facility].  He exercised for 8 minutes on the high ramp protocol and had no chest discomfort or unusual dyspnea.  I read his ECG response as abnormal but nondiagnostic.    He has no HTN, DM, or dyslipidemia.  He has never smoked but does have family members who have, including a brother who was stented in his late 40s and a father who had an MI in his late 50s and had 5-vessel CABG.    He feels well with no new symptoms or cardiovascular complaints and no change in exercise capacity.  He denies chest discomfort, KWAN, palpitations, PND/orthopnea, lightheadedness and syncope.          Past Medical History:   Diagnosis Date    Allergy        Past Surgical History:   Procedure Laterality Date    COLONOSCOPY      WISDOM TOOTH EXTRACTION         Social History     Tobacco Use    Smoking status: Never    Smokeless tobacco: Never   Substance Use Topics    Alcohol use: Yes     Alcohol/week: 1.0 standard drink of alcohol     Types: 1 Cans of beer per week    Drug use: Never       Family History   Problem Relation Name Age of Onset    Diabetes Mother Aylin Major     Cancer Father Lalo Major     Arthritis Father Lalo Major     Heart disease Father Lalo Major         heart attack    Other (stent placement) Brother priscilla major        Current Outpatient Medications   Medication Sig    cetirizine (ZYRTEC) 5 MG chewable tablet Take 5 mg by mouth once daily.    desonide (DESOWEN) 0.05 % cream Apply topically 2 (two) times daily.    multivitamin with minerals tablet Take 1 tablet by mouth once daily.    naproxen sodium (ANAPROX) 220 MG tablet Take 220 mg by mouth every 12 (twelve) hours. (Patient not taking: Reported on 10/25/2024)     No current facility-administered  medications for this visit.       Review of patient's allergies indicates:  No Known Allergies    ROS  The review of systems is negative except as above.    Objective:   Physical Exam  Vitals reviewed.   Constitutional:       Appearance: He is well-developed.   HENT:      Head: Normocephalic and atraumatic.   Eyes:      General: No scleral icterus.     Conjunctiva/sclera: Conjunctivae normal.   Neck:      Vascular: No JVD.   Cardiovascular:      Rate and Rhythm: Normal rate and regular rhythm.      Pulses: Intact distal pulses.      Heart sounds: Normal heart sounds. No murmur heard.     No friction rub. No gallop.   Pulmonary:      Effort: Pulmonary effort is normal.      Breath sounds: Normal breath sounds. No wheezing or rales.   Abdominal:      General: Bowel sounds are normal. There is no distension.      Palpations: Abdomen is soft.      Tenderness: There is no abdominal tenderness.   Musculoskeletal:         General: Normal range of motion.      Cervical back: Normal range of motion and neck supple.   Skin:     General: Skin is warm and dry.      Findings: No erythema or rash.   Neurological:      Mental Status: He is alert and oriented to person, place, and time.   Psychiatric:         Behavior: Behavior normal.         Thought Content: Thought content normal.         Judgment: Judgment normal.     Lab Results   Component Value Date    WBC 5.01 09/13/2024    HGB 15.4 09/13/2024    HCT 47.2 09/13/2024    MCV 90 09/13/2024     09/13/2024         Chemistry        Component Value Date/Time     09/13/2024 1002    K 4.5 09/13/2024 1002     09/13/2024 1002    CO2 28 09/13/2024 1002    BUN 16 09/13/2024 1002    CREATININE 1.0 09/13/2024 1002    GLU 88 09/13/2024 1002        Component Value Date/Time    CALCIUM 9.9 09/13/2024 1002    ALKPHOS 64 09/13/2024 1002    AST 34 09/13/2024 1002    ALT 70 (H) 09/13/2024 1002    BILITOT 0.5 09/13/2024 1002            Lab Results   Component Value Date    CHOL  217 (H) 09/13/2024     Lab Results   Component Value Date    HDL 58 09/13/2024     Lab Results   Component Value Date    LDLCALC 140.0 09/13/2024     Lab Results   Component Value Date    TRIG 95 09/13/2024     Lab Results   Component Value Date    CHOLHDL 26.7 09/13/2024       Lab Results   Component Value Date    TSH 1.265 09/13/2024       Lab Results   Component Value Date    HGBA1C 5.0 09/13/2024         Assessment:     1. Abnormal stress ECG    2. Family history of premature CAD    3. ALT (SGPT) level raised        Plan:     Lp(a)    Repeat ALT    Offered CACS CT but he declines.    Diet/exercise goals reinforced.    F/U PRN

## 2024-10-28 ENCOUNTER — LAB VISIT (OUTPATIENT)
Dept: LAB | Facility: HOSPITAL | Age: 39
End: 2024-10-28
Attending: INTERNAL MEDICINE
Payer: COMMERCIAL

## 2024-10-28 DIAGNOSIS — Z82.49 FAMILY HISTORY OF PREMATURE CAD: ICD-10-CM

## 2024-10-28 DIAGNOSIS — R74.01 ALT (SGPT) LEVEL RAISED: ICD-10-CM

## 2024-10-28 LAB
ALBUMIN SERPL BCP-MCNC: 4.2 G/DL (ref 3.5–5.2)
ALP SERPL-CCNC: 83 U/L (ref 40–150)
ALT SERPL W/O P-5'-P-CCNC: 228 U/L (ref 10–44)
AST SERPL-CCNC: 83 U/L (ref 10–40)
BILIRUB DIRECT SERPL-MCNC: 0.2 MG/DL (ref 0.1–0.3)
BILIRUB SERPL-MCNC: 0.5 MG/DL (ref 0.1–1)
PROT SERPL-MCNC: 7.3 G/DL (ref 6–8.4)

## 2024-10-28 PROCEDURE — 83695 ASSAY OF LIPOPROTEIN(A): CPT | Performed by: INTERNAL MEDICINE

## 2024-10-28 PROCEDURE — 80076 HEPATIC FUNCTION PANEL: CPT | Performed by: INTERNAL MEDICINE

## 2024-10-31 ENCOUNTER — CLINICAL SUPPORT (OUTPATIENT)
Dept: REHABILITATION | Facility: HOSPITAL | Age: 39
End: 2024-10-31
Payer: COMMERCIAL

## 2024-10-31 DIAGNOSIS — M54.50 ACUTE BILATERAL LOW BACK PAIN WITHOUT SCIATICA: Primary | ICD-10-CM

## 2024-10-31 PROCEDURE — 97530 THERAPEUTIC ACTIVITIES: CPT | Mod: PO | Performed by: PHYSICAL THERAPIST

## 2024-10-31 PROCEDURE — 97140 MANUAL THERAPY 1/> REGIONS: CPT | Mod: PO | Performed by: PHYSICAL THERAPIST

## 2024-10-31 PROCEDURE — 97112 NEUROMUSCULAR REEDUCATION: CPT | Mod: PO | Performed by: PHYSICAL THERAPIST

## 2024-11-01 LAB — LPA SERPL-MCNC: 15 MG/DL (ref 0–30)

## 2024-11-06 ENCOUNTER — PATIENT MESSAGE (OUTPATIENT)
Dept: CARDIOLOGY | Facility: CLINIC | Age: 39
End: 2024-11-06
Payer: COMMERCIAL

## 2024-11-06 ENCOUNTER — TELEPHONE (OUTPATIENT)
Dept: CARDIOLOGY | Facility: CLINIC | Age: 39
End: 2024-11-06
Payer: COMMERCIAL

## 2024-11-06 DIAGNOSIS — R74.8 ABNORMAL LIVER ENZYMES: Primary | ICD-10-CM

## 2024-11-06 NOTE — PROGRESS NOTES
[facility]   Physical Therapy Treatment Note        Name: Jarret Walden Grant  [facility] Number: [id number]    Therapy Diagnosis:   Encounter Diagnosis   Name Primary?    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 Minimal stiffness this morning.  He was compliant with home exercise program.  Response to previous treatment: mild soreness in hamstrings  Functional change: increasing lifting, carrying, and transfers.    Pain: 1/10  Location: bilateral low back    OBJECTIVE     11/7/2024  Lumbar flexion = 65 degrees (no pain or aberrant motion)    TREATMENT     Jarret received the treatments listed below:      Therapeutic Exercises to develop strength, endurance, ROM, posture, and core stabilization for 00 minutes including:      Manual Therapy Techniques: Joint mobilizations were applied to the: spine for 00 minutes, including:  Left L5-S1 rotation mobilization - grade V  Supine thoracic extension mobilization - grade V    Not Today:  Right prone SI distraction - grade V   Left L4-L5 lumbar rotation mobilization - grade V  Lumbar FMP      Neuromuscular Re-education activities to improve: Proprioception, Posture, and motor control for 23 minutes. The following activities were included:  DKTC with kick - green ball; x30  Clamshells - green band; 3x10 bilateral  Single Leg Bridge - 3x10 bilateral    Not Today:  Bent Knee Fall Out with  BP cuff - x20 bilateral   Step Up with Pull Down - blue + 6 inch box; x20 bilateral  Marches - with BP cuff - x20 bilateral  Straight Leg Raise with BP cuff - 2x10 bilateral  Pallof Press with rotation - green handles; x20 bilateral      Therapeutic Activities to improve functional performance for 32 minutes, including:  Smith Press Squats - 25#; 3x8  Dead Lifts - hex bar; 3x8  Education (see below)  Questions and answers    Not Today:  Tidal Tank Carries - 2 clinic laps    PATIENT EDUCATION AND HOME EXERCISES      Home Exercises Provided and Patient Education Provided     Education provided:   Anatomy and Pathology.  Symptom management and plan of care progressions.  Home Exercise Program - progressed on 11/7/2024  Discharge planning    Written Home Exercises Provided: Patient instructed to cont prior HEP. Exercises were reviewed and Jarret was able to demonstrate them prior to the end of the session.  Jarret demonstrated good  understanding of the education provided. See EMR under Patient Instructions for exercises provided during therapy sessions    ASSESSMENT     Jarret presents today with no pain in lumbar cardinal plane range of motion. Unable to reproduce symptoms even with today's progressions to squats and dead lifts. No cuing needed for correct technique during performance. Progressing well under current plan of care. Tentative discharge set for 11/19/2024.    From evaluation on 10/4/2024 - Jarret is a 39 y.o. male referred to outpatient Physical Therapy with a medical diagnosis of Lumbar Radiculopathy. Patient presents with decreased range of motion, decreased strength, gait deviations, and joint hypomobility. Signs and symptoms are consistent with the low back pain classification of movement coordination impairments (stabilization). No true directional preference present. He will benefit from skilled physical therapy addressing his transverse abdominus training, lumbopelvic control, core utilization, and hip  strengthening.    Jarret Is progressing well towards his goals.   Pt prognosis is Good.     Pt will continue to benefit from skilled outpatient physical therapy to address the deficits listed in the problem list box on initial evaluation, provide pt/family education and to maximize pt's level of independence in the home and community environment. Pt's spiritual, cultural and educational needs considered and pt agreeable to plan of care and goals.     Anticipated barriers to physical therapy: none    Goals:  Short Term Goals: 4-5 weeks   Patient will have reduced pain complaints from 8/10 to less than or equal to 4/10. (Met - 10/17/2024)  Patient will demonstrate increased AROM/PROM by approximately 25% to 50% of initial measurements. (Met - 10/25/2024)  Patient will demonstrate increased muscle strength of at least one-half grade as compared to the initial measurements. (Not Met - Progressing)     Long Term Goals: 8-10 weeks   Patient will have reduced pain complaints from 4/10 to less than or equal to 0/10. (Not Met - Progressing)  Patient will demonstrate increased AROM/PROM by approximately 75% to 100% of initial measurements. (Met - 11/7/2024)  Patient will demonstrate increased muscle strength of at least one grade as compared to the initial measurements. (Not Met - Progressing)  Patient will be able to perform a dead lift of greater than or equal to 30 pounds with good hip hinging mechanics and no pain. (Met - 11/7/2024)   Patient will improve Lumbar Spine FOTO Intake score from 43% to greater than or equal to 67%. (Not Met - Progressing)  Patient will be independent with their home exercise program. (Not Met - Progressing)    PLAN     Lumbar Stabilization program.    Plan of care Certification: 10/4/2024 to 12/27/2024.  Outpatient Physical Therapy 1 times weekly for 10 weeks.    Elvis Mitchell, PT , DPT, OCS

## 2024-11-06 NOTE — TELEPHONE ENCOUNTER
----- Message from Collin Moise MD sent at 11/6/2024  3:33 PM CST -----  Please schedule Mr. Grant to see Hepatology.  I sent him a message about his liver enzymes being abnormal and worsening.  I'd like to get him seen soon.    Thanks,  Collin

## 2024-11-07 ENCOUNTER — CLINICAL SUPPORT (OUTPATIENT)
Dept: REHABILITATION | Facility: HOSPITAL | Age: 39
End: 2024-11-07
Payer: COMMERCIAL

## 2024-11-07 DIAGNOSIS — M54.50 ACUTE BILATERAL LOW BACK PAIN WITHOUT SCIATICA: Primary | ICD-10-CM

## 2024-11-07 PROCEDURE — 97112 NEUROMUSCULAR REEDUCATION: CPT | Mod: PO | Performed by: PHYSICAL THERAPIST

## 2024-11-07 PROCEDURE — 97530 THERAPEUTIC ACTIVITIES: CPT | Mod: PO | Performed by: PHYSICAL THERAPIST

## 2024-11-18 NOTE — PROGRESS NOTES
[facility]   Physical Therapy Treatment Note and Discharge Summary       Name: Jarret Grant  Clinic Number: [id number]    Therapy Diagnosis:   Encounter Diagnosis   Name Primary?    Acute bilateral low back pain without sciatica Yes     Physician: Chelle Good P*    Visit Date: 11/19/2024  Physician Orders: PT Eval and Treat  Medical Diagnosis from Referral: M54.16 (ICD-10-CM) - Lumbar radiculopathy   Evaluation Date: 10/4/2024  Authorization Period Expiration: 10/3/2025  Plan of Care Expiration: 12/27/2024  Visit # / Visits authorized: 6/ 20     Foto  Date  Score    #1/3 10/4/2024 43%   #2/3 10/25/2024 59%   #3/3 11/19/2024 83%      Time In: 1000  Time Out: 1050  Total Billable Time: 46 minutes    Precautions: Standard    SUBJECTIVE     Pt reports: only occasional symptoms in the superior left glute. Ready for discharge. Will follow-up as needed.  He was compliant with home exercise program.  Response to previous treatment: mild soreness in hamstrings  Functional change: increasing lifting, carrying, and transfers.    Pain: 0/10  Location: bilateral low back    OBJECTIVE     11/19/2024    Active Range of Motion:  Lumbar Spine   Action Degrees Dysfunction   Flexion 65 To knees - lumbar extension on rising   Extension 20 Reproduction of left symptoms   Left Rotation 75% Tightness   Right Rotation 75% None   Left Sidebend 30 None   Right Sidebend 30 None         Manual Muscle Testing:  Lower Extremity   Action Left Right   Hip Flexion 4+ / 5 5 / 5   Hip Extension 5 / 5 5 / 5   Hip Abduction 4+ / 5 5 / 5        TREATMENT     Jarret received the treatments listed below:      Therapeutic Exercises to develop strength, endurance, ROM, posture, and core stabilization for 00 minutes including:      Manual Therapy Techniques: Joint mobilizations were applied to the: spine for 8 minutes, including:  Left L5-S1 rotation mobilization - grade V    Not Today:  Right prone SI distraction -  grade V   Left L4-L5 lumbar rotation mobilization - grade V  Lumbar FMP  Supine thoracic extension mobilization - grade V      Neuromuscular Re-education activities to improve: Proprioception, Posture, and motor control for 15 minutes. The following activities were included:  Dead Bugs - 3x5  Lateral Plank with Clamshells - green band; 3x10 bilateral    Not Today:  Bent Knee Fall Out with BP cuff - x20 bilateral   Step Up with Pull Down - blue + 6 inch box; x20 bilateral  Marches - with BP cuff - x20 bilateral  Straight Leg Raise with BP cuff - 2x10 bilateral  Pallof Press with rotation - green handles; x20 bilateral  Single Leg Bridge - 3x10 bilateral      Therapeutic Activities to improve functional performance for 23 minutes, including:  Reassessment as above  Dead Lifts - hex bar; 2x6  Education (see below)  Questions and answers    Not Today:  Tidal Tank Carries - 2 clinic laps  Smith Press Squats - 25#; 3x8    PATIENT EDUCATION AND HOME EXERCISES      Home Exercises Provided and Patient Education Provided     Education provided:   Anatomy and Pathology.  Symptom management and plan of care progressions.  Home Exercise Program - updated and reviewed on 11/19/2024  Discharge instructions    Written Home Exercises Provided: Patient instructed to cont prior HEP. Exercises were reviewed and Jarret was able to demonstrate them prior to the end of the session.  Jarret demonstrated good  understanding of the education provided. See EMR under Patient Instructions for exercises provided during therapy sessions    ASSESSMENT     Jarret has met 100% of his goals as of the 7th visit and is independent with his home exercise program; therefore, he is discharged from skilled physical therapy. Provided progressions to some exercises for ongoing therapeutic benefits. He will call or reach out electronically if his symptoms return or if any new symptoms arise. Patient gave verbal acknowledgement and understanding of all  instructions and education, and is in agreement with the plan. Patient is discharged.    From evaluation on 10/4/2024 - Jarret is a 39 y.o. male referred to outpatient Physical Therapy with a medical diagnosis of Lumbar Radiculopathy. Patient presents with decreased range of motion, decreased strength, gait deviations, and joint hypomobility. Signs and symptoms are consistent with the low back pain classification of movement coordination impairments (stabilization). No true directional preference present. He will benefit from skilled physical therapy addressing his transverse abdominus training, lumbopelvic control, core utilization, and hip strengthening.    Jarret Is progressing well towards his goals.   Pt prognosis is Good.     Pt will continue to benefit from skilled outpatient physical therapy to address the deficits listed in the problem list box on initial evaluation, provide pt/family education and to maximize pt's level of independence in the home and community environment. Pt's spiritual, cultural and educational needs considered and pt agreeable to plan of care and goals.     Anticipated barriers to physical therapy: none    Goals:  Short Term Goals: 4-5 weeks   Patient will have reduced pain complaints from 8/10 to less than or equal to 4/10. (Met - 10/17/2024)  Patient will demonstrate increased AROM/PROM by approximately 25% to 50% of initial measurements. (Met - 10/25/2024)  Patient will demonstrate increased muscle strength of at least one-half grade as compared to the initial measurements. (Met - 11/19/2024)     Long Term Goals: 8-10 weeks   Patient will have reduced pain complaints from 4/10 to less than or equal to 0/10. (Met - 11/19/2024)  Patient will demonstrate increased AROM/PROM by approximately 75% to 100% of initial measurements. (Met - 11/7/2024)  Patient will demonstrate increased muscle strength of at least one grade as compared to the initial measurements. (Met - 11/19/2024)  Patient  will be able to perform a dead lift of greater than or equal to 30 pounds with good hip hinging mechanics and no pain. (Met - 11/7/2024)   Patient will improve Lumbar Spine FOTO Intake score from 43% to greater than or equal to 67%. (Met - 11/19/2024)  Patient will be independent with their home exercise program. (Met - 11/19/2024)    PLAN     Patient is discharged.    Plan of care Certification: 10/4/2024 to 12/27/2024.  Outpatient Physical Therapy 1 times weekly for 10 weeks.    Elvis Mitchell, PT , DPT, OCS

## 2024-11-19 ENCOUNTER — CLINICAL SUPPORT (OUTPATIENT)
Dept: REHABILITATION | Facility: HOSPITAL | Age: 39
End: 2024-11-19
Payer: COMMERCIAL

## 2024-11-19 DIAGNOSIS — M54.50 ACUTE BILATERAL LOW BACK PAIN WITHOUT SCIATICA: Primary | ICD-10-CM

## 2024-11-19 PROCEDURE — 97140 MANUAL THERAPY 1/> REGIONS: CPT | Mod: PO | Performed by: PHYSICAL THERAPIST

## 2024-11-19 PROCEDURE — 97530 THERAPEUTIC ACTIVITIES: CPT | Mod: PO | Performed by: PHYSICAL THERAPIST

## 2024-11-19 PROCEDURE — 97112 NEUROMUSCULAR REEDUCATION: CPT | Mod: PO | Performed by: PHYSICAL THERAPIST

## 2025-02-24 ENCOUNTER — LAB VISIT (OUTPATIENT)
Dept: LAB | Facility: HOSPITAL | Age: 40
End: 2025-02-24
Payer: COMMERCIAL

## 2025-02-24 ENCOUNTER — OFFICE VISIT (OUTPATIENT)
Dept: HEPATOLOGY | Facility: CLINIC | Age: 40
End: 2025-02-24
Payer: COMMERCIAL

## 2025-02-24 VITALS — WEIGHT: 174.38 LBS | BODY MASS INDEX: 23.62 KG/M2 | HEIGHT: 72 IN

## 2025-02-24 DIAGNOSIS — R74.8 ABNORMAL LIVER ENZYMES: Primary | ICD-10-CM

## 2025-02-24 DIAGNOSIS — R74.8 ABNORMAL LIVER ENZYMES: ICD-10-CM

## 2025-02-24 LAB
25(OH)D3+25(OH)D2 SERPL-MCNC: 60 NG/ML (ref 30–96)
ALBUMIN SERPL BCP-MCNC: 4.3 G/DL (ref 3.5–5.2)
ALP SERPL-CCNC: 76 U/L (ref 40–150)
ALT SERPL W/O P-5'-P-CCNC: 159 U/L (ref 10–44)
AST SERPL-CCNC: 78 U/L (ref 10–40)
BILIRUB DIRECT SERPL-MCNC: 0.3 MG/DL (ref 0.1–0.3)
BILIRUB SERPL-MCNC: 0.8 MG/DL (ref 0.1–1)
FERRITIN SERPL-MCNC: 122 NG/ML (ref 20–300)
HAV IGM SERPL QL IA: NORMAL
HBV CORE IGM SERPL QL IA: NORMAL
HBV SURFACE AG SERPL QL IA: NORMAL
HCV AB SERPL QL IA: NORMAL
IRON SERPL-MCNC: 192 UG/DL (ref 45–160)
PROT SERPL-MCNC: 7.6 G/DL (ref 6–8.4)
SATURATED IRON: 51 % (ref 20–50)
TOTAL IRON BINDING CAPACITY: 373 UG/DL (ref 250–450)
TRANSFERRIN SERPL-MCNC: 252 MG/DL (ref 200–375)
VIT B12 SERPL-MCNC: 498 PG/ML (ref 210–950)

## 2025-02-24 PROCEDURE — 1160F RVW MEDS BY RX/DR IN RCRD: CPT | Mod: CPTII,S$GLB,,

## 2025-02-24 PROCEDURE — 80076 HEPATIC FUNCTION PANEL: CPT

## 2025-02-24 PROCEDURE — 82607 VITAMIN B-12: CPT

## 2025-02-24 PROCEDURE — 80074 ACUTE HEPATITIS PANEL: CPT

## 2025-02-24 PROCEDURE — 82306 VITAMIN D 25 HYDROXY: CPT

## 2025-02-24 PROCEDURE — 1159F MED LIST DOCD IN RCRD: CPT | Mod: CPTII,S$GLB,,

## 2025-02-24 PROCEDURE — 84466 ASSAY OF TRANSFERRIN: CPT

## 2025-02-24 PROCEDURE — 99999 PR PBB SHADOW E&M-EST. PATIENT-LVL IV: CPT | Mod: PBBFAC,,,

## 2025-02-24 PROCEDURE — 82728 ASSAY OF FERRITIN: CPT

## 2025-02-24 PROCEDURE — 3008F BODY MASS INDEX DOCD: CPT | Mod: CPTII,S$GLB,,

## 2025-02-24 PROCEDURE — 99214 OFFICE O/P EST MOD 30 MIN: CPT | Mod: S$GLB,,,

## 2025-02-24 PROCEDURE — 36415 COLL VENOUS BLD VENIPUNCTURE: CPT

## 2025-02-24 NOTE — PATIENT INSTRUCTIONS
Labs today - if remain abnormal will complete full sero workup and perform fibroscan with follow up      Magnesium glycinate can be used to help treat muscle cramping. This can be found over the counter. I recommend taking 200 mg nightly to start.

## 2025-02-24 NOTE — PROGRESS NOTES
[facility] - New Patient    REFERRING PROVIDER:  Dr. Collin Moise  PCP: No, Primary Doctor     Chief Complaint:  elevated liver enzymes       HISTORY       HPI: This is a 39 y.o. patient with PMH noted below, presenting for evaluation of elevated liver enzymes      Previous serologic w/u negative for viral hepatitis C.     Prior serologic workup:   Lab Results   Component Value Date    HEPCAB Non-reactive 09/13/2024         Interval HPI: Presents today alone. Reports that he was referred for elevated liver enzymes. Denies acute illness during the time of labs. Denies any new medications. Was suffering with some lower back pain and continue to do PT for this and that has significantly improved. Denies use of excessive tylenol or other OTC medications during that time. Denies any new concerning symptoms. Patient does request to have vitamin levels checked to make sure he is getting what he should supplement wise.    Diet: well balanced  Exercise: 3-4 x a week  Supplements: MVI, fish oil, tumeric   Occupation: CPA      LABS & DIAGNOSTIC STUDIES       Labs done 10/2024 show elevated transaminase levels (ALT > AST, elevated since 9/2024)  Platelets wnl, alk phos wnl  Synthetic liver functioning wnl    Lab Results   Component Value Date     (H) 10/28/2024    AST 83 (H) 10/28/2024    ALKPHOS 83 10/28/2024    BILITOT 0.5 10/28/2024    ALBUMIN 4.2 10/28/2024     09/13/2024       Imaging:  CT renal stone study done 10/2024 showed  FINDINGS:  Examination degraded by patient motion artifact.     Visualized heart: Normal in size. No pericardial effusion.     Lung bases: Limited evaluation due to respiratory motion artifact.  Lungs are symmetrically expanded.  No definitive pulmonary nodule.  No large focal consolidation.  No pleural effusion.     Liver: Normal in size and contour.  No focal hepatic lesion.     Gallbladder: Decompressed and incompletely evaluated.     Bile Ducts: No intrahepatic or  extrahepatic biliary ductal dilatation.     Pancreas: No peripancreatic soft tissue stranding or mass.     Spleen: Normal size and attenuation.  Small splenule.     Adrenals: No significant abnormalities     Kidneys/Ureters: Normal in size and location. No renal cortical lesions.  No hydronephrosis or nephrolithiasis. No ureteral dilatation.     Bladder: No evidence of wall thickening.     Reproductive organs: Dystrophic calcifications of the prostate.     Peritoneum: No intraperitoneal free air or free fluid.  No focal mass or fluid collection.  Prominent mesenteric lymph nodes without pathologic enlargement.     Retroperitoneum: Scattered shotty periaortic lymph nodes without pathologic enlargement.     Bowel/Mesentery: Stomach is distended with gastric content.  Small and large bowel are normal in caliber without evidence of obstruction or inflammatory changes.  Mild colonic stool burden.  Appendix is visualized and unremarkable.     Abdominal wall:  Fat containing umbilical hernia.     Vasculature: No aneurysm. No significant calcific atherosclerosis.     Bones: Mild degenerative changes of the spine.  No acute fractures or suspicious osseous lesions.  No suspicious osseous lesions.     Impression:     No nephrolithiasis or hydroureteronephrosis.     Mild colonic stool burden.     Motion artifact does limit evaluation.     Additional findings as above.      Liver fibrosis staging:  -- pending labs will get fibroscan          Denies family history of liver disease.     Denies significant alcohol consumption currently or in the past.     Immunity to Hep A and B - will check with next labs          Allergy and medication list reviewed and updated     PMHX:  has a past medical history of Allergy.    PSHX:  has a past surgical history that includes Colonoscopy and [place] tooth extraction.    FAMILY HISTORY: Updated and reviewed in EPIC    ROS:   GENERAL: Denies fatigue  CARDIOVASCULAR: Denies edema  GI: Denies  abdominal pain  SKIN: Denies rash, itching   NEURO: Denies confusion, memory loss, or mood changes    PHYSICAL EXAM:   In no acute distress; alert and oriented to person, place and time  VITALS: Ht 6' (1.829 m)   Wt 79.1 kg (174 lb 6.1 oz)   BMI 23.65 kg/m²   EYES: Sclerae anicteric  GI: Soft, non-tender, non-distended. No ascites.  EXTREMITIES:  No edema.  SKIN: Warm and dry. No jaundice. No telangectasias noted. No palmar erythema.  NEURO:  No asterixis.  PSYCH:  Thought and speech pattern appropriate. Behavior normal          ASSESSMENT & PLAN        EDUCATION:  See instructions discussed with patient in Instructions section of the After Visit Summary     ASSESSMENT & PLAN:  39 y.o. male with:   1. Elevated liver enzymes   -- Labs note elevated transaminase levels since 9/2024  -- synthetic liver function wnl  -- CT renal stone study 10/2024 noted liver was wnl  -- do not suspect any medications contributing   -- previous serological work up : -HCV, will complete hepatitis panel, Fe, ferritin  --- Hep A and B immunity: see HPI  -- labs today        Follow up for pending workup. with labs before  Orders Placed This Encounter   Procedures    Hepatic Function Panel    Hepatitis Panel, Acute    Iron and TIBC    Ferritin    Vitamin D    VITAMIN B12        Thank you for allowing me to participate in the care of CHACE Shell, FNP-C  Nurse Practitioner  [facility]  [facility]     I spent a total of 30 minutes on the day of the visit.This includes face to face time and non-face to face time preparing to see the patient (eg, review of tests), obtaining and/or reviewing separately obtained history, documenting clinical information in the electronic or other health record, independently interpreting results and communicating results to the patient/family/caregiver, and coordinating care.         CC'ed note to:   Collin Moise MD

## 2025-02-25 ENCOUNTER — RESULTS FOLLOW-UP (OUTPATIENT)
Dept: HEPATOLOGY | Facility: CLINIC | Age: 40
End: 2025-02-25
Payer: COMMERCIAL

## 2025-02-25 DIAGNOSIS — R74.8 ABNORMAL LIVER ENZYMES: Primary | ICD-10-CM

## 2025-02-25 DIAGNOSIS — E83.19 IRON OVERLOAD: ICD-10-CM

## 2025-02-25 NOTE — TELEPHONE ENCOUNTER
----- Message from Erin Ibarra NP sent at 2/25/2025  8:59 AM CST -----  Please schedule patient for labs in 4-6 weeks with a follow up in 3 months.  ----- Message -----  From: Naresh 5 CUPS and some sugar Lab Interface  Sent: 2/24/2025   6:16 PM CST  To: Erin Ibarra NP

## 2025-03-28 ENCOUNTER — LAB VISIT (OUTPATIENT)
Dept: LAB | Facility: HOSPITAL | Age: 40
End: 2025-03-28
Payer: COMMERCIAL

## 2025-03-28 DIAGNOSIS — E83.19 IRON OVERLOAD: ICD-10-CM

## 2025-03-28 DIAGNOSIS — R74.8 ABNORMAL LIVER ENZYMES: ICD-10-CM

## 2025-03-28 LAB
A1AT SERPL-MCNC: 113 MG/DL (ref 100–190)
ALBUMIN SERPL BCP-MCNC: 4 G/DL (ref 3.5–5.2)
ALP SERPL-CCNC: 65 UNIT/L (ref 40–150)
ALT SERPL W/O P-5'-P-CCNC: 110 UNIT/L (ref 10–44)
AST SERPL-CCNC: 64 UNIT/L (ref 11–45)
BILIRUB DIRECT SERPL-MCNC: 0.2 MG/DL (ref 0.1–0.3)
BILIRUB SERPL-MCNC: 0.6 MG/DL (ref 0.1–1)
CERULOPLASMIN SERPL-MCNC: 21 MG/DL (ref 15–45)
FERRITIN SERPL-MCNC: 94 NG/ML (ref 20–300)
IRON SATN MFR SERPL: 30 % (ref 20–50)
IRON SERPL-MCNC: 120 UG/DL (ref 45–160)
PROT SERPL-MCNC: 7.2 GM/DL (ref 6–8.4)
TIBC SERPL-MCNC: 398 UG/DL (ref 250–450)
TRANSFERRIN SERPL-MCNC: 269 MG/DL (ref 200–375)

## 2025-03-28 PROCEDURE — 82040 ASSAY OF SERUM ALBUMIN: CPT

## 2025-03-28 PROCEDURE — 81256 HFE GENE: CPT

## 2025-03-28 PROCEDURE — 80321 ALCOHOLS BIOMARKERS 1OR 2: CPT

## 2025-03-28 PROCEDURE — 86015 ACTIN ANTIBODY EACH: CPT

## 2025-03-28 PROCEDURE — 86038 ANTINUCLEAR ANTIBODIES: CPT

## 2025-03-28 PROCEDURE — 82728 ASSAY OF FERRITIN: CPT

## 2025-03-28 PROCEDURE — 86381 MITOCHONDRIAL ANTIBODY EACH: CPT

## 2025-03-28 PROCEDURE — 36415 COLL VENOUS BLD VENIPUNCTURE: CPT | Mod: PN

## 2025-03-28 PROCEDURE — 82390 ASSAY OF CERULOPLASMIN: CPT

## 2025-03-28 PROCEDURE — 82103 ALPHA-1-ANTITRYPSIN TOTAL: CPT

## 2025-03-28 PROCEDURE — 84466 ASSAY OF TRANSFERRIN: CPT

## 2025-03-31 LAB
ANA (OHS): NORMAL
PLPETH BLD-MCNC: 98 NG/ML
POPETH BLD-MCNC: 127 NG/ML
TOXICOLOGIST REVIEW: NORMAL

## 2025-04-01 LAB
MITOCHONDRIA AB TITR SER IF: NORMAL {TITER}
SMOOTH MUSCLE AB TITR SER IF: NORMAL {TITER}

## 2025-04-02 ENCOUNTER — RESULTS FOLLOW-UP (OUTPATIENT)
Dept: HEPATOLOGY | Facility: CLINIC | Age: 40
End: 2025-04-02
Payer: COMMERCIAL

## 2025-04-02 DIAGNOSIS — R74.8 ABNORMAL LIVER ENZYMES: Primary | ICD-10-CM

## 2025-04-02 LAB
GENETIC VARIANT DETAILS BLD/T: NORMAL
GENETICIST REVIEW: NORMAL
LAB TEST METHOD: NORMAL
MOL DX INTERP BLD/T QL: NEGATIVE
PROVIDER SIGNING NAME: NORMAL
SPECIMEN SOURCE: NORMAL

## 2025-04-02 NOTE — TELEPHONE ENCOUNTER
Contacted the pt schedule labs on 5/26/2025 at 12;00 am.Fibroscan same day on 5/11/2025 at 8;45 am.Mailed appt.

## 2025-05-23 ENCOUNTER — LAB VISIT (OUTPATIENT)
Dept: LAB | Facility: HOSPITAL | Age: 40
End: 2025-05-23
Payer: COMMERCIAL

## 2025-05-23 DIAGNOSIS — R74.8 ABNORMAL LIVER ENZYMES: ICD-10-CM

## 2025-05-23 LAB
ALBUMIN SERPL BCP-MCNC: 4.3 G/DL (ref 3.5–5.2)
ALP SERPL-CCNC: 66 UNIT/L (ref 40–150)
ALT SERPL W/O P-5'-P-CCNC: 56 UNIT/L (ref 10–44)
AST SERPL-CCNC: 25 UNIT/L (ref 11–45)
BILIRUB DIRECT SERPL-MCNC: 0.2 MG/DL (ref 0.1–0.3)
BILIRUB SERPL-MCNC: 0.6 MG/DL (ref 0.1–1)
PROT SERPL-MCNC: 7.7 GM/DL (ref 6–8.4)

## 2025-05-23 PROCEDURE — 80076 HEPATIC FUNCTION PANEL: CPT

## 2025-05-23 PROCEDURE — 36415 COLL VENOUS BLD VENIPUNCTURE: CPT | Mod: PN

## 2025-05-25 ENCOUNTER — RESULTS FOLLOW-UP (OUTPATIENT)
Dept: HEPATOLOGY | Facility: CLINIC | Age: 40
End: 2025-05-25

## 2025-06-11 ENCOUNTER — PROCEDURE VISIT (OUTPATIENT)
Dept: HEPATOLOGY | Facility: CLINIC | Age: 40
End: 2025-06-11
Payer: COMMERCIAL

## 2025-06-11 ENCOUNTER — OFFICE VISIT (OUTPATIENT)
Dept: HEPATOLOGY | Facility: CLINIC | Age: 40
End: 2025-06-11
Payer: COMMERCIAL

## 2025-06-11 VITALS — HEIGHT: 72 IN | BODY MASS INDEX: 24.07 KG/M2 | WEIGHT: 177.69 LBS

## 2025-06-11 DIAGNOSIS — K76.0 HEPATIC STEATOSIS: Primary | ICD-10-CM

## 2025-06-11 DIAGNOSIS — F10.90 ALCOHOL USE: ICD-10-CM

## 2025-06-11 DIAGNOSIS — R74.8 ABNORMAL LIVER ENZYMES: ICD-10-CM

## 2025-06-11 PROCEDURE — 1159F MED LIST DOCD IN RCRD: CPT | Mod: CPTII,S$GLB,,

## 2025-06-11 PROCEDURE — 99999 PR PBB SHADOW E&M-EST. PATIENT-LVL III: CPT | Mod: PBBFAC,,,

## 2025-06-11 PROCEDURE — 3008F BODY MASS INDEX DOCD: CPT | Mod: CPTII,S$GLB,,

## 2025-06-11 PROCEDURE — 91200 LIVER ELASTOGRAPHY: CPT | Mod: S$GLB,,,

## 2025-06-11 PROCEDURE — 99214 OFFICE O/P EST MOD 30 MIN: CPT | Mod: S$GLB,,,

## 2025-06-11 PROCEDURE — 1160F RVW MEDS BY RX/DR IN RCRD: CPT | Mod: CPTII,S$GLB,,

## 2025-06-11 RX ORDER — CLINDAMYCIN PHOSPHATE 11.9 MG/ML
SOLUTION TOPICAL 2 TIMES DAILY PRN
COMMUNITY
Start: 2025-03-14

## 2025-06-11 NOTE — PROGRESS NOTES
[facility] - Est Patient    PCP: Britt, Primary Doctor     Chief Complaint:  elevated liver enzymes       HISTORY       HPI: This is a 40 y.o. patient with PMH noted below, presenting for follow up of elevated liver enzymes    Previous serologic w/u negative for Blaine's, alpha-1 antitrypsin deficiency, hemochromatosis, autoimmune etiology, and viral hepatitis.    Prior serologic workup:   Lab Results   Component Value Date    SMOOTHMUSCAB Negative 1:40 03/28/2025    AMAIFA Negative 1:40 03/28/2025    FERRITIN 94.0 03/28/2025    FESATURATED 51 (H) 02/24/2025    Q2REIRRRWO 113 03/28/2025    CERULOPLSM 21.0 03/28/2025    HEPBSAG Non-reactive 02/24/2025    HEPCAB Non-reactive 02/24/2025    HEPAIGM Non-reactive 02/24/2025           Interval HPI: Presents today alone. No new concerns from a liver standpoint. Patient started milk thistle. Other than that no big lifestyle changes. Continues to drink about the same, states that he is fine trying to keep it to less than 6 servings a week. Alcohol use is only obvious risk factor for fatty liver. Liver enzymes have trended down since last visit.    Diet: well balanced  Exercise: 3-4 x a week  Supplements: MVI, fish oil, tumeric   Occupation: CPA      LABS & DIAGNOSTIC STUDIES       Labs done 5/2025 show elevated transaminase levels (ALT > AST, elevated since 9/2024)  Platelets wnl, alk phos wnl  Synthetic liver functioning wnl    Lab Results   Component Value Date    ALT 56 (H) 05/23/2025    AST 25 05/23/2025    ALKPHOS 66 05/23/2025    BILITOT 0.6 05/23/2025    ALBUMIN 4.3 05/23/2025     09/13/2024       Imaging:  CT renal stone study done 10/2024 showed  FINDINGS:  Examination degraded by patient motion artifact.     Visualized heart: Normal in size. No pericardial effusion.     Lung bases: Limited evaluation due to respiratory motion artifact.  Lungs are symmetrically expanded.  No definitive pulmonary nodule.  No large focal consolidation.  No pleural  effusion.     Liver: Normal in size and contour.  No focal hepatic lesion.     Gallbladder: Decompressed and incompletely evaluated.     Bile Ducts: No intrahepatic or extrahepatic biliary ductal dilatation.     Pancreas: No peripancreatic soft tissue stranding or mass.     Spleen: Normal size and attenuation.  Small splenule.     Adrenals: No significant abnormalities     Kidneys/Ureters: Normal in size and location. No renal cortical lesions.  No hydronephrosis or nephrolithiasis. No ureteral dilatation.     Bladder: No evidence of wall thickening.     Reproductive organs: Dystrophic calcifications of the prostate.     Peritoneum: No intraperitoneal free air or free fluid.  No focal mass or fluid collection.  Prominent mesenteric lymph nodes without pathologic enlargement.     Retroperitoneum: Scattered shotty periaortic lymph nodes without pathologic enlargement.     Bowel/Mesentery: Stomach is distended with gastric content.  Small and large bowel are normal in caliber without evidence of obstruction or inflammatory changes.  Mild colonic stool burden.  Appendix is visualized and unremarkable.     Abdominal wall:  Fat containing umbilical hernia.     Vasculature: No aneurysm. No significant calcific atherosclerosis.     Bones: Mild degenerative changes of the spine.  No acute fractures or suspicious osseous lesions.  No suspicious osseous lesions.     Impression:     No nephrolithiasis or hydroureteronephrosis.     Mild colonic stool burden.     Motion artifact does limit evaluation.     Additional findings as above.      Liver fibrosis staging:  -- Fibroscan 6/2025 noted S1, F0-1 (, kPa 5.7)          Denies family history of liver disease.     Denies significant alcohol consumption currently or in the past.     Immunity to Hep A and B - recommend vaccination if not immune         Allergy and medication list reviewed and updated     PMHX:  has a past medical history of Allergy.    PSHX:  has a past  surgical history that includes Colonoscopy and [place] tooth extraction.    FAMILY HISTORY: Updated and reviewed in EPIC    ROS:   GENERAL: Denies fatigue  CARDIOVASCULAR: Denies edema  GI: Denies abdominal pain  SKIN: Denies rash, itching   NEURO: Denies confusion, memory loss, or mood changes    PHYSICAL EXAM:   In no acute distress; alert and oriented to person, place and time  VITALS: Ht 6' (1.829 m)   Wt 80.6 kg (177 lb 11.1 oz)   BMI 24.10 kg/m²   EYES: Sclerae anicteric  GI: Soft, non-tender, non-distended. No ascites.  EXTREMITIES:  No edema.  SKIN: Warm and dry. No jaundice. No telangectasias noted. No palmar erythema.  NEURO:  No asterixis.  PSYCH:  Thought and speech pattern appropriate. Behavior normal          ASSESSMENT & PLAN        EDUCATION:  See instructions discussed with patient in Instructions section of the After Visit Summary     ASSESSMENT & PLAN:  40 y.o. male with:  1.  Fatty liver  - see HPI  -- Immunity to Hep A and B : see HPI  -- Fibroscan 6/2025 noted mild steatosis and no fibrosis  -- Recommendations discussed with patient:  Limit alcohol consumption, no more than 2 serving(s) of alcohol in any day (1 serving is 5 ounces of wine, 12 ounces of beer, or 1.5 ounces of liquor) and do NOT recommend any daily alcohol use    2 Maintain healthy weight  3. Low carb/sugar, high fiber and protein diet, limit your carb intake to LESS than 30-45 grams of carbs with a meal or LESS than 5-10 grams with any snack   4. Exercise, 5 days per week, 30 minutes per day, as tolerated  5. Recommend good cholesterol, blood pressure, blood sugar levels   6. There is a new medication called Rezdiffra for the treatment of F2-F3 liver scarring due to fatty liver. It is only indicated for those with stage 2 or 3 scar tissue (not currently)    2. Elevated liver enzymes  -- full sero work up negative  -- likely related to fatty liver  -- labs before return to clinic     3. Alcohol use   -- Limit alcohol  consumption, no more than 2 servings of alcohol in a 24 hours period (1 serving is 5 ounces of wine, 12 ounces of beer, or 1.5 ounces of liquor) and NO daily alcohol use     Social History     Substance and Sexual Activity   Alcohol Use Not Currently    Comment: 2-3 times a week, 4-6 servings           Follow up in about 1 year (around 6/11/2026). with labs and fibroscan before  Orders Placed This Encounter   Procedures    FibroScan Transplant Hepatology(Vibration Controlled Transient Elastography)    Hepatic Function Panel    Phosphatidylethanol (PETH)        Thank you for allowing me to participate in the care of CHACE Shell, FNP-C  Nurse Practitioner  [facility]  [facility]     I spent a total of 30 minutes on the day of the visit.This includes face to face time and non-face to face time preparing to see the patient (eg, review of tests), obtaining and/or reviewing separately obtained history, documenting clinical information in the electronic or other health record, independently interpreting results and communicating results to the patient/family/caregiver, and coordinating care.

## 2025-06-11 NOTE — PATIENT INSTRUCTIONS
1. Your Fibroscan to look for fat or scar tissue in the liver showed ~11% fat in the liver, no scar tissue damage   2. Follow up in 1 year with labs and fibroscan before    FATTY LIVER:  These things are important to improve fatty liver:  Limit alcohol consumption, no more than 2 serving(s) of alcohol in any day (1 serving is 5 ounces of wine, 12 ounces of beer, or 1.5 ounces of liquor) and do NOT recommend any daily alcohol use    2. Maintain healthy weight  3. [facility] offers benefits to patients so let me know if you want a referral to the [facility] gym. Also, [facility] has dieticians that can create a weight loss plan. If you are interested let me know and I can place a referral. If so, contact the dietician team at [facility] at email [email or web address] ([email or web address]) or call [phone].  to get scheduled. They do offer video visits   4. Avoid processed foods. No fried/fast foods. No sugary drinks or drinks with any calories.   5. Low carb/sugar and high protein diet (90 grams per day of protein).Try to limit your carb intake to LESS than  grams per day total. Use MyFitness Pal or Lose It maria dolores to add up your carbs through the day. Do NOT drink any beverages with calories or carbs (this can lead to high blood sugar and weight gain). The main thing to focus on is high protein, low carb)  6. Exercise, 5 days per week, 30 minutes per day, as tolerated  7. Recommend good cholesterol, blood pressure, blood sugar levels   8. There is a new medication called Rezdiffra for the treatment of F2-F3 liver scarring due to fatty liver. It is only indicated for patients with significant scar tissue from fatty liver (not you currently)    In some people, fatty liver can progress to steatohepatitis (inflamatory fatty liver) and possibly to cirrhosis, increasing the risk for liver cancer, liver failure, and death. Therefore, the lifestyle changes are very important to decrease this  risk.     Helpful website for MASH/fatty liver: https://mash.pocn.com/patient-resources/

## 2025-06-11 NOTE — PROCEDURES
FibroScan Transplant Hepatology(Vibration Controlled Transient Elastography)    Date/Time: 6/11/2025 8:45 AM    Performed by: Erin Ibarra NP  Authorized by: Erin Ibarra NP    Diagnosis:  NAFLD    Probe:  M    Universal Protocol: Patient's identity, procedure and site were verified, confirmatory pause was performed.  Discussed procedure including risks and potential complications.  Questions answered.  Patient verbalizes understanding and wishes to proceed with VCTE.     Procedure: After providing explanations of the procedure, patient was placed in the supine position with right arm in maximum abduction to allow optimal exposure of right lateral abdomen.  Patient was briefly assessed, Testing was performed in the mid-axillary location, 50Hz Shear Wave pulses were applied and the resulting Shear Wave and Propagation Speed detected with a 3.5 MHz ultrasonic signal, using the FibroScan probe, Skin to liver capsule distance and liver parenchyma were accessed during the entire examination with the FibroScan probe, Patient was instructed to breathe normally and to abstain from sudden movements during the procedure, allowing for random measurements of liver stiffness. At least 10 Shear Waves were produced, Individual measurements of each Shear Wave were calculated.  Patient tolerated the procedure well with no complications.  Meets discharge criteria as was dismissed.  Rates pain 0 out of 10.  Patient will follow up with ordering provider to review results.    Findings  Median liver stiffness score:  5.7  CAP Reading: dB/m:  240    IQR/med %:  11  Interpretation  Fibrosis interpretation is based on medial liver stiffness - Kilopascal (kPa).    Fibrosis Stage:  F 0-1  Steatosis interpretation is based on controlled attenuation parameter - (dB/m).    Steatosis Grade:  S1

## 2025-08-12 DIAGNOSIS — Z13.6 ENCOUNTER FOR SCREENING FOR CARDIOVASCULAR DISORDERS: Primary | ICD-10-CM
